# Patient Record
Sex: FEMALE | Race: WHITE | NOT HISPANIC OR LATINO | Employment: OTHER | URBAN - METROPOLITAN AREA
[De-identification: names, ages, dates, MRNs, and addresses within clinical notes are randomized per-mention and may not be internally consistent; named-entity substitution may affect disease eponyms.]

---

## 2017-10-03 ENCOUNTER — OFFICE VISIT (OUTPATIENT)
Dept: FAMILY MEDICINE CLINIC | Facility: CLINIC | Age: 37
End: 2017-10-03

## 2017-10-03 VITALS
DIASTOLIC BLOOD PRESSURE: 80 MMHG | RESPIRATION RATE: 16 BRPM | SYSTOLIC BLOOD PRESSURE: 120 MMHG | TEMPERATURE: 98.9 F | WEIGHT: 143 LBS | HEART RATE: 64 BPM | BODY MASS INDEX: 23.08 KG/M2

## 2017-10-03 DIAGNOSIS — Z20.2 POTENTIAL EXPOSURE TO STD: Primary | ICD-10-CM

## 2017-10-03 LAB
HCV AB SER DONR QL: NORMAL
HIV1+2 AB SER QL: NORMAL

## 2017-10-03 PROCEDURE — G0432 EIA HIV-1/HIV-2 SCREEN: HCPCS | Performed by: FAMILY MEDICINE

## 2017-10-03 PROCEDURE — 99213 OFFICE O/P EST LOW 20 MIN: CPT | Performed by: FAMILY MEDICINE

## 2017-10-03 PROCEDURE — 36415 COLL VENOUS BLD VENIPUNCTURE: CPT | Performed by: FAMILY MEDICINE

## 2017-10-03 PROCEDURE — 86592 SYPHILIS TEST NON-TREP QUAL: CPT | Performed by: FAMILY MEDICINE

## 2017-10-03 PROCEDURE — 86803 HEPATITIS C AB TEST: CPT | Performed by: FAMILY MEDICINE

## 2017-10-03 NOTE — PROGRESS NOTES
Subjective   Uriel Herman is a 37 y.o. female.     History of Present Illness   Found questionable information about dating sites on [employer] tablet and feels uneasy all of a sudden. To see a counselor later today. Has not been seeing other people. Had Pap Sept 7 with good report. 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  No active symptoms of infection.  The following portions of the patient's history were reviewed and updated as appropriate: allergies, current medications, past family history, past medical history, past social history, past surgical history and problem list.    Review of Systems   Constitutional: Negative.    Respiratory: Negative.    Cardiovascular: Negative.    Gastrointestinal: Negative.    Genitourinary: Negative for difficulty urinating, dysuria and vaginal discharge.   Allergic/Immunologic: Negative.        Objective   Physical Exam   Constitutional: She appears well-developed. No distress.   Neurological: She is alert.   Psychiatric: She has a normal mood and affect.   Vitals reviewed.      Assessment/Plan   Uriel was seen today for exposure to std.    Diagnoses and all orders for this visit:    Potential exposure to STD  -     Chlamydia trachomatis, Neisseria gonorrhoeae, PCR - Urine, Vagina  -     RPR  -     Hepatitis C Antibody  -     HIV-1/O/2 Ag/Ab w Reflex

## 2017-10-04 LAB — RPR SER QL: NORMAL

## 2019-05-03 ENCOUNTER — OFFICE VISIT (OUTPATIENT)
Dept: FAMILY MEDICINE CLINIC | Facility: CLINIC | Age: 39
End: 2019-05-03

## 2019-05-03 VITALS
BODY MASS INDEX: 23.95 KG/M2 | HEART RATE: 75 BPM | HEIGHT: 66 IN | TEMPERATURE: 97.6 F | SYSTOLIC BLOOD PRESSURE: 106 MMHG | RESPIRATION RATE: 16 BRPM | DIASTOLIC BLOOD PRESSURE: 64 MMHG | OXYGEN SATURATION: 98 % | WEIGHT: 149 LBS

## 2019-05-03 DIAGNOSIS — R59.0 ENLARGED LYMPH NODE IN NECK: ICD-10-CM

## 2019-05-03 DIAGNOSIS — R42 INTERMITTENT VERTIGO: ICD-10-CM

## 2019-05-03 DIAGNOSIS — N64.4 BREAST PAIN, RIGHT: ICD-10-CM

## 2019-05-03 DIAGNOSIS — H65.03 BILATERAL ACUTE SEROUS OTITIS MEDIA, RECURRENCE NOT SPECIFIED: ICD-10-CM

## 2019-05-03 DIAGNOSIS — Z00.00 ANNUAL PHYSICAL EXAM: Primary | ICD-10-CM

## 2019-05-03 LAB
ALBUMIN SERPL-MCNC: 4.3 G/DL (ref 3.5–5.2)
ALBUMIN/GLOB SERPL: 1.7 G/DL
ALP SERPL-CCNC: 69 U/L (ref 39–117)
ALT SERPL W P-5'-P-CCNC: 19 U/L (ref 1–33)
ANION GAP SERPL CALCULATED.3IONS-SCNC: 12.4 MMOL/L
AST SERPL-CCNC: 19 U/L (ref 1–32)
BASOPHILS # BLD AUTO: 0.04 10*3/MM3 (ref 0–0.2)
BASOPHILS NFR BLD AUTO: 0.6 % (ref 0–1.5)
BILIRUB BLD-MCNC: NEGATIVE MG/DL
BILIRUB SERPL-MCNC: 0.3 MG/DL (ref 0.2–1.2)
BUN BLD-MCNC: 12 MG/DL (ref 6–20)
BUN/CREAT SERPL: 15.6 (ref 7–25)
CALCIUM SPEC-SCNC: 8.9 MG/DL (ref 8.6–10.5)
CHLORIDE SERPL-SCNC: 102 MMOL/L (ref 98–107)
CHOLEST SERPL-MCNC: 176 MG/DL (ref 0–200)
CLARITY, POC: CLEAR
CO2 SERPL-SCNC: 23.6 MMOL/L (ref 22–29)
COLOR UR: YELLOW
CREAT BLD-MCNC: 0.77 MG/DL (ref 0.57–1)
DEPRECATED RDW RBC AUTO: 43.2 FL (ref 37–54)
EOSINOPHIL # BLD AUTO: 0.04 10*3/MM3 (ref 0–0.4)
EOSINOPHIL NFR BLD AUTO: 0.6 % (ref 0.3–6.2)
ERYTHROCYTE [DISTWIDTH] IN BLOOD BY AUTOMATED COUNT: 11.9 % (ref 12.3–15.4)
EXPIRATION DATE: ABNORMAL
GFR SERPL CREATININE-BSD FRML MDRD: 83 ML/MIN/1.73
GLOBULIN UR ELPH-MCNC: 2.6 GM/DL
GLUCOSE BLD-MCNC: 81 MG/DL (ref 65–99)
GLUCOSE UR STRIP-MCNC: NEGATIVE MG/DL
HCT VFR BLD AUTO: 44.4 % (ref 34–46.6)
HDLC SERPL-MCNC: 43 MG/DL (ref 40–60)
HGB BLD-MCNC: 14.1 G/DL (ref 12–15.9)
IMM GRANULOCYTES # BLD AUTO: 0.02 10*3/MM3 (ref 0–0.05)
IMM GRANULOCYTES NFR BLD AUTO: 0.3 % (ref 0–0.5)
KETONES UR QL: ABNORMAL
LDLC SERPL CALC-MCNC: 119 MG/DL (ref 0–100)
LDLC/HDLC SERPL: 2.76 {RATIO}
LEUKOCYTE EST, POC: NEGATIVE
LYMPHOCYTES # BLD AUTO: 1.74 10*3/MM3 (ref 0.7–3.1)
LYMPHOCYTES NFR BLD AUTO: 24.4 % (ref 19.6–45.3)
Lab: ABNORMAL
MCH RBC QN AUTO: 31.5 PG (ref 26.6–33)
MCHC RBC AUTO-ENTMCNC: 31.8 G/DL (ref 31.5–35.7)
MCV RBC AUTO: 99.1 FL (ref 79–97)
MONOCYTES # BLD AUTO: 0.34 10*3/MM3 (ref 0.1–0.9)
MONOCYTES NFR BLD AUTO: 4.8 % (ref 5–12)
NEUTROPHILS # BLD AUTO: 4.94 10*3/MM3 (ref 1.7–7)
NEUTROPHILS NFR BLD AUTO: 69.3 % (ref 42.7–76)
NITRITE UR-MCNC: NEGATIVE MG/ML
NRBC BLD AUTO-RTO: 0 /100 WBC (ref 0–0.2)
PH UR: 5.5 [PH] (ref 5–8)
PLATELET # BLD AUTO: 224 10*3/MM3 (ref 140–450)
PMV BLD AUTO: 11.1 FL (ref 6–12)
POTASSIUM BLD-SCNC: 4 MMOL/L (ref 3.5–5.2)
PROT SERPL-MCNC: 6.9 G/DL (ref 6–8.5)
PROT UR STRIP-MCNC: NEGATIVE MG/DL
RBC # BLD AUTO: 4.48 10*6/MM3 (ref 3.77–5.28)
RBC # UR STRIP: NEGATIVE /UL
SODIUM BLD-SCNC: 138 MMOL/L (ref 136–145)
SP GR UR: 1.01 (ref 1–1.03)
TRIGL SERPL-MCNC: 71 MG/DL (ref 0–150)
TSH SERPL DL<=0.05 MIU/L-ACNC: 1.63 MIU/ML (ref 0.27–4.2)
UROBILINOGEN UR QL: NORMAL
VLDLC SERPL-MCNC: 14.2 MG/DL (ref 5–40)
WBC NRBC COR # BLD: 7.12 10*3/MM3 (ref 3.4–10.8)

## 2019-05-03 PROCEDURE — 84443 ASSAY THYROID STIM HORMONE: CPT | Performed by: NURSE PRACTITIONER

## 2019-05-03 PROCEDURE — 81003 URINALYSIS AUTO W/O SCOPE: CPT | Performed by: NURSE PRACTITIONER

## 2019-05-03 PROCEDURE — 85025 COMPLETE CBC W/AUTO DIFF WBC: CPT | Performed by: NURSE PRACTITIONER

## 2019-05-03 PROCEDURE — 36415 COLL VENOUS BLD VENIPUNCTURE: CPT | Performed by: NURSE PRACTITIONER

## 2019-05-03 PROCEDURE — 80053 COMPREHEN METABOLIC PANEL: CPT | Performed by: NURSE PRACTITIONER

## 2019-05-03 PROCEDURE — 99395 PREV VISIT EST AGE 18-39: CPT | Performed by: NURSE PRACTITIONER

## 2019-05-03 PROCEDURE — 80061 LIPID PANEL: CPT | Performed by: NURSE PRACTITIONER

## 2019-05-03 RX ORDER — MECLIZINE HYDROCHLORIDE 25 MG/1
25 TABLET ORAL 2 TIMES DAILY PRN
Qty: 30 TABLET | Refills: 0 | Status: SHIPPED | OUTPATIENT
Start: 2019-05-03 | End: 2023-02-07

## 2019-05-03 NOTE — PATIENT INSTRUCTIONS
Health Maintenance, Female  Adopting a healthy lifestyle and getting preventive care can go a long way to promote health and wellness. Talk with your health care provider about what schedule of regular examinations is right for you. This is a good chance for you to check in with your provider about disease prevention and staying healthy.  In between checkups, there are plenty of things you can do on your own. Experts have done a lot of research about which lifestyle changes and preventive measures are most likely to keep you healthy. Ask your health care provider for more information.  Weight and diet  Eat a healthy diet  · Be sure to include plenty of vegetables, fruits, low-fat dairy products, and lean protein.  · Do not eat a lot of foods high in solid fats, added sugars, or salt.  · Get regular exercise. This is one of the most important things you can do for your health.  ? Most adults should exercise for at least 150 minutes each week. The exercise should increase your heart rate and make you sweat (moderate-intensity exercise).  ? Most adults should also do strengthening exercises at least twice a week. This is in addition to the moderate-intensity exercise.    Maintain a healthy weight  · Body mass index (BMI) is a measurement that can be used to identify possible weight problems. It estimates body fat based on height and weight. Your health care provider can help determine your BMI and help you achieve or maintain a healthy weight.  · For females 20 years of age and older:  ? A BMI below 18.5 is considered underweight.  ? A BMI of 18.5 to 24.9 is normal.  ? A BMI of 25 to 29.9 is considered overweight.  ? A BMI of 30 and above is considered obese.    Watch levels of cholesterol and blood lipids  · You should start having your blood tested for lipids and cholesterol at 20 years of age, then have this test every 5 years.  · You may need to have your cholesterol levels checked more often if:  ? Your lipid or  cholesterol levels are high.  ? You are older than 50 years of age.  ? You are at high risk for heart disease.    Cancer screening  Lung Cancer  · Lung cancer screening is recommended for adults 55-80 years old who are at high risk for lung cancer because of a history of smoking.  · A yearly low-dose CT scan of the lungs is recommended for people who:  ? Currently smoke.  ? Have quit within the past 15 years.  ? Have at least a 30-pack-year history of smoking. A pack year is smoking an average of one pack of cigarettes a day for 1 year.  · Yearly screening should continue until it has been 15 years since you quit.  · Yearly screening should stop if you develop a health problem that would prevent you from having lung cancer treatment.    Breast Cancer  · Practice breast self-awareness. This means understanding how your breasts normally appear and feel.  · It also means doing regular breast self-exams. Let your health care provider know about any changes, no matter how small.  · If you are in your 20s or 30s, you should have a clinical breast exam (CBE) by a health care provider every 1-3 years as part of a regular health exam.  · If you are 40 or older, have a CBE every year. Also consider having a breast X-ray (mammogram) every year.  · If you have a family history of breast cancer, talk to your health care provider about genetic screening.  · If you are at high risk for breast cancer, talk to your health care provider about having an MRI and a mammogram every year.  · Breast cancer gene (BRCA) assessment is recommended for women who have family members with BRCA-related cancers. BRCA-related cancers include:  ? Breast.  ? Ovarian.  ? Tubal.  ? Peritoneal cancers.  · Results of the assessment will determine the need for genetic counseling and BRCA1 and BRCA2 testing.    Cervical Cancer  Your health care provider may recommend that you be screened regularly for cancer of the pelvic organs (ovaries, uterus, and  vagina). This screening involves a pelvic examination, including checking for microscopic changes to the surface of your cervix (Pap test). You may be encouraged to have this screening done every 3 years, beginning at age 21.  · For women ages 30-65, health care providers may recommend pelvic exams and Pap testing every 3 years, or they may recommend the Pap and pelvic exam, combined with testing for human papilloma virus (HPV), every 5 years. Some types of HPV increase your risk of cervical cancer. Testing for HPV may also be done on women of any age with unclear Pap test results.  · Other health care providers may not recommend any screening for nonpregnant women who are considered low risk for pelvic cancer and who do not have symptoms. Ask your health care provider if a screening pelvic exam is right for you.  · If you have had past treatment for cervical cancer or a condition that could lead to cancer, you need Pap tests and screening for cancer for at least 20 years after your treatment. If Pap tests have been discontinued, your risk factors (such as having a new sexual partner) need to be reassessed to determine if screening should resume. Some women have medical problems that increase the chance of getting cervical cancer. In these cases, your health care provider may recommend more frequent screening and Pap tests.    Colorectal Cancer  · This type of cancer can be detected and often prevented.  · Routine colorectal cancer screening usually begins at 50 years of age and continues through 75 years of age.  · Your health care provider may recommend screening at an earlier age if you have risk factors for colon cancer.  · Your health care provider may also recommend using home test kits to check for hidden blood in the stool.  · A small camera at the end of a tube can be used to examine your colon directly (sigmoidoscopy or colonoscopy). This is done to check for the earliest forms of colorectal  cancer.  · Routine screening usually begins at age 50.  · Direct examination of the colon should be repeated every 5-10 years through 75 years of age. However, you may need to be screened more often if early forms of precancerous polyps or small growths are found.    Skin Cancer  · Check your skin from head to toe regularly.  · Tell your health care provider about any new moles or changes in moles, especially if there is a change in a mole's shape or color.  · Also tell your health care provider if you have a mole that is larger than the size of a pencil eraser.  · Always use sunscreen. Apply sunscreen liberally and repeatedly throughout the day.  · Protect yourself by wearing long sleeves, pants, a wide-brimmed hat, and sunglasses whenever you are outside.    Heart disease, diabetes, and high blood pressure  · High blood pressure causes heart disease and increases the risk of stroke. High blood pressure is more likely to develop in:  ? People who have blood pressure in the high end of the normal range (130-139/85-89 mm Hg).  ? People who are overweight or obese.  ? People who are .  · If you are 18-39 years of age, have your blood pressure checked every 3-5 years. If you are 40 years of age or older, have your blood pressure checked every year. You should have your blood pressure measured twice--once when you are at a hospital or clinic, and once when you are not at a hospital or clinic. Record the average of the two measurements. To check your blood pressure when you are not at a hospital or clinic, you can use:  ? An automated blood pressure machine at a pharmacy.  ? A home blood pressure monitor.  · If you are between 55 years and 79 years old, ask your health care provider if you should take aspirin to prevent strokes.  · Have regular diabetes screenings. This involves taking a blood sample to check your fasting blood sugar level.  ? If you are at a normal weight and have a low risk for  diabetes, have this test once every three years after 45 years of age.  ? If you are overweight and have a high risk for diabetes, consider being tested at a younger age or more often.  Preventing infection  Hepatitis B  · If you have a higher risk for hepatitis B, you should be screened for this virus. You are considered at high risk for hepatitis B if:  ? You were born in a country where hepatitis B is common. Ask your health care provider which countries are considered high risk.  ? Your parents were born in a high-risk country, and you have not been immunized against hepatitis B (hepatitis B vaccine).  ? You have HIV or AIDS.  ? You use needles to inject street drugs.  ? You live with someone who has hepatitis B.  ? You have had sex with someone who has hepatitis B.  ? You get hemodialysis treatment.  ? You take certain medicines for conditions, including cancer, organ transplantation, and autoimmune conditions.    Hepatitis C  · Blood testing is recommended for:  ? Everyone born from 1945 through 1965.  ? Anyone with known risk factors for hepatitis C.    Sexually transmitted infections (STIs)  · You should be screened for sexually transmitted infections (STIs) including gonorrhea and chlamydia if:  ? You are sexually active and are younger than 24 years of age.  ? You are older than 24 years of age and your health care provider tells you that you are at risk for this type of infection.  ? Your sexual activity has changed since you were last screened and you are at an increased risk for chlamydia or gonorrhea. Ask your health care provider if you are at risk.  · If you do not have HIV, but are at risk, it may be recommended that you take a prescription medicine daily to prevent HIV infection. This is called pre-exposure prophylaxis (PrEP). You are considered at risk if:  ? You are sexually active and do not regularly use condoms or know the HIV status of your partner(s).  ? You take drugs by injection.  ? You  are sexually active with a partner who has HIV.    Talk with your health care provider about whether you are at high risk of being infected with HIV. If you choose to begin PrEP, you should first be tested for HIV. You should then be tested every 3 months for as long as you are taking PrEP.  Pregnancy  · If you are premenopausal and you may become pregnant, ask your health care provider about preconception counseling.  · If you may become pregnant, take 400 to 800 micrograms (mcg) of folic acid every day.  · If you want to prevent pregnancy, talk to your health care provider about birth control (contraception).  Osteoporosis and menopause  · Osteoporosis is a disease in which the bones lose minerals and strength with aging. This can result in serious bone fractures. Your risk for osteoporosis can be identified using a bone density scan.  · If you are 65 years of age or older, or if you are at risk for osteoporosis and fractures, ask your health care provider if you should be screened.  · Ask your health care provider whether you should take a calcium or vitamin D supplement to lower your risk for osteoporosis.  · Menopause may have certain physical symptoms and risks.  · Hormone replacement therapy may reduce some of these symptoms and risks.  Talk to your health care provider about whether hormone replacement therapy is right for you.  Follow these instructions at home:  · Schedule regular health, dental, and eye exams.  · Stay current with your immunizations.  · Do not use any tobacco products including cigarettes, chewing tobacco, or electronic cigarettes.  · If you are pregnant, do not drink alcohol.  · If you are breastfeeding, limit how much and how often you drink alcohol.  · Limit alcohol intake to no more than 1 drink per day for nonpregnant women. One drink equals 12 ounces of beer, 5 ounces of wine, or 1½ ounces of hard liquor.  · Do not use street drugs.  · Do not share needles.  · Ask your health care  provider for help if you need support or information about quitting drugs.  · Tell your health care provider if you often feel depressed.  · Tell your health care provider if you have ever been abused or do not feel safe at home.  This information is not intended to replace advice given to you by your health care provider. Make sure you discuss any questions you have with your health care provider.  Document Released: 07/02/2012 Document Revised: 05/25/2017 Document Reviewed: 09/20/2016  [employer] Interactive Patient Education © 2019 [employer]    Preventive Care 18-39 Years, Female  Preventive care refers to lifestyle choices and visits with your health care provider that can promote health and wellness.  What does preventive care include?  · A yearly physical exam. This is also called an annual well check.  · Dental exams once or twice a year.  · Routine eye exams. Ask your health care provider how often you should have your eyes checked.  · Personal lifestyle choices, including:  ? Daily care of your teeth and gums.  ? Regular physical activity.  ? Eating a healthy diet.  ? Avoiding tobacco and drug use.  ? Limiting alcohol use.  ? Practicing safe sex.  ? Taking vitamin and mineral supplements as recommended by your health care provider.  What happens during an annual well check?  The services and screenings done by your health care provider during your annual well check will depend on your age, overall health, lifestyle risk factors, and family history of disease.  Counseling  Your health care provider may ask you questions about your:  · Alcohol use.  · Tobacco use.  · Drug use.  · Emotional well-being.  · Home and relationship well-being.  · Sexual activity.  · Eating habits.  · Work and work environment.  · Method of birth control.  · Menstrual cycle.  · Pregnancy history.    Screening  You may have the following tests or measurements:  · Height, weight, and BMI.  · Diabetes screening. This is done by  checking your blood sugar (glucose) after you have not eaten for a while (fasting).  · Blood pressure.  · Lipid and cholesterol levels. These may be checked every 5 years starting at age 20.  · Skin check.  · Hepatitis C blood test.  · Hepatitis B blood test.  · Sexually transmitted disease (STD) testing.  · BRCA-related cancer screening. This may be done if you have a family history of breast, ovarian, tubal, or peritoneal cancers.  · Pelvic exam and Pap test. This may be done every 3 years starting at age 21. Starting at age 30, this may be done every 5 years if you have a Pap test in combination with an HPV test.    Discuss your test results, treatment options, and if necessary, the need for more tests with your health care provider.  Vaccines  Your health care provider may recommend certain vaccines, such as:  · Influenza vaccine. This is recommended every year.  · Tetanus, diphtheria, and acellular pertussis (Tdap, Td) vaccine. You may need a Td booster every 10 years.  · Varicella vaccine. You may need this if you have not been vaccinated.  · HPV vaccine. If you are 26 or younger, you may need three doses over 6 months.  · Measles, mumps, and rubella (MMR) vaccine. You may need at least one dose of MMR. You may also need a second dose.  · Pneumococcal 13-valent conjugate (PCV13) vaccine. You may need this if you have certain conditions and were not previously vaccinated.  · Pneumococcal polysaccharide (PPSV23) vaccine. You may need one or two doses if you smoke cigarettes or if you have certain conditions.  · Meningococcal vaccine. One dose is recommended if you are age 19-21 years and a first-year college student living in a residence [place], or if you have one of several medical conditions. You may also need additional booster doses.  · Hepatitis A vaccine. You may need this if you have certain conditions or if you travel or work in places where you may be exposed to hepatitis A.  · Hepatitis B vaccine. You  may need this if you have certain conditions or if you travel or work in places where you may be exposed to hepatitis B.  · Haemophilus influenzae type b (Hib) vaccine. You may need this if you have certain risk factors.    Talk to your health care provider about which screenings and vaccines you need and how often you need them.  This information is not intended to replace advice given to you by your health care provider. Make sure you discuss any questions you have with your health care provider.  Document Released: 02/13/2003 Document Revised: 07/31/2018 Document Reviewed: 10/18/2016  [employer] Interactive Patient Education © 2019 [employer]  Vertigo  Vertigo is the feeling that you or your surroundings are moving when they are not. Vertigo can be dangerous if it occurs while you are doing something that could endanger you or others, such as driving.  What are the causes?  This condition is caused by a disturbance in the signals that are sent by your body’s sensory systems to your brain. Different causes of a disturbance can lead to vertigo, including:  · Infections, especially in the inner ear.  · A bad reaction to a drug, or misuse of alcohol and medicines.  · Withdrawal from drugs or alcohol.  · Quickly changing positions, as when lying down or rolling over in bed.  · Migraine headaches.  · Decreased blood flow to the brain.  · Decreased blood pressure.  · Increased pressure in the brain from a head or neck injury, stroke, infection, tumor, or bleeding.  · Central nervous system disorders.    What are the signs or symptoms?  Symptoms of this condition usually occur when you move your head or your eyes in different directions. Symptoms may start suddenly, and they usually last for less than a minute. Symptoms may include:  · Loss of balance and falling.  · Feeling like you are spinning or moving.  · Feeling like your surroundings are spinning or moving.  · Nausea and vomiting.  · Blurred vision or double  vision.  · Difficulty hearing.  · Slurred speech.  · Dizziness.  · Involuntary eye movement (nystagmus).    Symptoms can be mild and cause only slight annoyance, or they can be severe and interfere with daily life. Episodes of vertigo may return (recur) over time, and they are often triggered by certain movements. Symptoms may improve over time.  How is this diagnosed?  This condition may be diagnosed based on medical history and the quality of your nystagmus. Your health care provider may test your eye movements by asking you to quickly change positions to trigger the nystagmus. This may be called the Lorraine-Hallpike test, head thrust test, or roll test. You may be referred to a health care provider who specializes in ear, nose, and throat (ENT) problems (otolaryngologist) or a provider who specializes in disorders of the central nervous system (neurologist).  You may have additional testing, including:  · A physical exam.  · Blood tests.  · MRI.  · A CT scan.  · An electrocardiogram (ECG). This records electrical activity in your heart.  · An electroencephalogram (EEG). This records electrical activity in your brain.  · Hearing tests.    How is this treated?  Treatment for this condition depends on the cause and the severity of the symptoms. Treatment options include:  · Medicines to treat nausea or vertigo. These are usually used for severe cases. Some medicines that are used to treat other conditions may also reduce or eliminate vertigo symptoms. These include:  ? Medicines that control allergies (antihistamines).  ? Medicines that control seizures (anticonvulsants).  ? Medicines that relieve depression (antidepressants).  ? Medicines that relieve anxiety (sedatives).  · Head movements to adjust your inner ear back to normal. If your vertigo is caused by an ear problem, your health care provider may recommend certain movements to correct the problem.  · Surgery. This is rare.    Follow these instructions at  home:  Safety  · Move slowly.Avoid sudden body or head movements.  · Avoid driving.  · Avoid operating heavy machinery.  · Avoid doing any tasks that would cause danger to you or others if you would have a vertigo episode during the task.  · If you have trouble walking or keeping your balance, try using a cane for stability. If you feel dizzy or unstable, sit down right away.  · Return to your normal activities as told by your health care provider. Ask your health care provider what activities are safe for you.  General instructions  · Take over-the-counter and prescription medicines only as told by your health care provider.  · Avoid certain positions or movements as told by your health care provider.  · Drink enough fluid to keep your urine clear or pale yellow.  · Keep all follow-up visits as told by your health care provider. This is important.  Contact a health care provider if:  · Your medicines do not relieve your vertigo or they make it worse.  · You have a fever.  · Your condition gets worse or you develop new symptoms.  · Your family or friends notice any behavioral changes.  · Your nausea or vomiting gets worse.  · You have numbness or a “pins and needles” sensation in part of your body.  Get help right away if:  · You have difficulty moving or speaking.  · You are always dizzy.  · You faint.  · You develop severe headaches.  · You have weakness in your hands, arms, or legs.  · You have changes in your hearing or vision.  · You develop a stiff neck.  · You develop sensitivity to light.  This information is not intended to replace advice given to you by your health care provider. Make sure you discuss any questions you have with your health care provider.  Document Released: 09/27/2006 Document Revised: 05/31/2017 Document Reviewed: 04/11/2016  [employer] Interactive Patient Education © 2019 [employer]  Meclizine tablets or capsules  What is this medicine?  MECLIZINE (NATY cleary) is an antihistamine. It  is used to prevent nausea, vomiting, or dizziness caused by motion sickness. It is also used to prevent and treat vertigo (extreme dizziness or a feeling that you or your surroundings are tilting or spinning around).  This medicine may be used for other purposes; ask your health care provider or pharmacist if you have questions.  COMMON BRAND NAME(S): Antivert, Dramamine Less Drowsy, Dramamine-N, Medivert, Meni-D  What should I tell my health care provider before I take this medicine?  They need to know if you have any of these conditions:  -glaucoma  -lung or breathing disease, like asthma  -problems urinating  -prostate disease  -stomach or intestine problems  -an unusual or allergic reaction to meclizine, other medicines, foods, dyes, or preservatives  -pregnant or trying to get pregnant  -breast-feeding  How should I use this medicine?  Take this medicine by mouth with a glass of water. Follow the directions on the prescription label. If you are using this medicine to prevent motion sickness, take the dose at least 1 hour before travel. If it upsets your stomach, take it with food or milk. Take your doses at regular intervals. Do not take your medicine more often than directed.  Talk to your pediatrician regarding the use of this medicine in children. Special care may be needed.  Overdosage: If you think you have taken too much of this medicine contact a poison control center or emergency room at once.  NOTE: This medicine is only for you. Do not share this medicine with others.  What if I miss a dose?  If you miss a dose, take it as soon as you can. If it is almost time for your next dose, take only that dose. Do not take double or extra doses.  What may interact with this medicine?  Do not take this medicine with any of the following medications:  -MAOIs like Carbex, Eldepryl, Marplan, Nardil, and Parnate  This medicine may also interact with the following medications:  -alcohol  -antihistamines for allergy,  cough and cold  -certain medicines for anxiety or sleep  -certain medicines for depression, like amitriptyline, fluoxetine, sertraline  -certain medicines for seizures like phenobarbital, primidone  -general anesthetics like halothane, isoflurane, methoxyflurane, propofol  -local anesthetics like lidocaine, pramoxine, tetracaine  -medicines that relax muscles for surgery  -narcotic medicines for pain  -phenothiazines like chlorpromazine, mesoridazine, prochlorperazine, thioridazine  This list may not describe all possible interactions. Give your health care provider a list of all the medicines, herbs, non-prescription drugs, or dietary supplements you use. Also tell them if you smoke, drink alcohol, or use illegal drugs. Some items may interact with your medicine.  What should I watch for while using this medicine?  Tell your doctor or healthcare professional if your symptoms do not start to get better or if they get worse.  You may get drowsy or dizzy. Do not drive, use machinery, or do anything that needs mental alertness until you know how this medicine affects you. Do not stand or sit up quickly, especially if you are an older patient. This reduces the risk of dizzy or fainting spells. Alcohol may interfere with the effect of this medicine. Avoid alcoholic drinks.  Your mouth may get dry. Chewing sugarless gum or sucking hard candy, and drinking plenty of water may help. Contact your doctor if the problem does not go away or is severe.  This medicine may cause dry eyes and blurred vision. If you wear contact lenses you may feel some discomfort. Lubricating drops may help. See your eye doctor if the problem does not go away or is severe.  What side effects may I notice from receiving this medicine?  Side effects that you should report to your doctor or health care professional as soon as possible:  -feeling faint or lightheaded, falls  -fast, irregular heartbeat  Side effects that usually do not require medical  attention (report to your doctor or health care professional if they continue or are bothersome):  -constipation  -headache  -trouble passing urine or change in the amount of urine  -trouble sleeping  -upset stomach  This list may not describe all possible side effects. Call your doctor for medical advice about side effects. You may report side effects to FDA at 2-713-QUH-3137.  Where should I keep my medicine?  Keep out of the reach of children.  Store at room temperature between 15 and 30 degrees C (59 and 86 degrees F). Keep container tightly closed. Throw away any unused medicine after the expiration date.  NOTE: This sheet is a summary. It may not cover all possible information. If you have questions about this medicine, talk to your doctor, pharmacist, or health care provider.  © 2019 [employer]/Gold Standard (2017-01-18 19:41:02)    Breast Tenderness  Breast tenderness is a common problem for women of all ages. Breast tenderness may cause mild discomfort to severe pain. The pain usually comes and goes in association with your menstrual cycle, but it can be constant. Breast tenderness has many possible causes, including hormone changes and some medicines. Your health care provider may order tests, such as a mammogram or an ultrasound, to check for any unusual findings. Having breast tenderness usually does not mean that you have breast cancer.  Follow these instructions at home:  Sometimes, reassurance that you do not have breast cancer is all that is needed. In general, follow these home care instructions:  Managing pain and discomfort  · If directed, apply ice to the area:  ? Put ice in a plastic bag.  ? Place a towel between your skin and the bag.  ? Leave the ice on for 20 minutes, 2-3 times a day.  · Make sure you are wearing a supportive bra, especially during exercise. You may also want to wear a supportive bra while sleeping if your breasts are very tender.  Medicines  · Take over-the-counter and  prescription medicines only as told by your health care provider. If the cause of your pain is infection, you may be prescribed an antibiotic medicine.  · If you were prescribed an antibiotic, take it as told by your health care provider. Do not stop taking the antibiotic even if you start to feel better.  General instructions  · Your health care provider may recommend that you reduce the amount of fat in your diet. You can do this by:  ? Limiting fried foods.  ? Cooking foods using methods, such as baking, boiling, grilling, and broiling.  · Decrease the amount of caffeine in your diet. You can do this by drinking more water and choosing caffeine-free options.  · Keep a log of the days and times when your breasts are most tender.  · Ask your health care provider how to do breast exams at home. This will help you notice if you have an unusual growth or lump.  Contact a health care provider if:  · Any part of your breast is hard, red, and hot to the touch. This may be a sign of infection.  · You are not breastfeeding and you have fluid, especially blood or pus, coming out of your nipples.  · You have a fever.  · You have a new or painful lump in your breast that remains after your menstrual period ends.  · Your pain does not improve or it gets worse.  · Your pain is interfering with your daily activities.  This information is not intended to replace advice given to you by your health care provider. Make sure you discuss any questions you have with your health care provider.  Document Released: 11/30/2009 Document Revised: 09/15/2017 Document Reviewed: 09/15/2017  [employer] Patient Education © 2019 [employer]

## 2019-05-03 NOTE — PROGRESS NOTES
Argentina iWlkinson is a 39 y.o. female.     The patient presents today for her annual physical exam and fasting labs. She is also c/o intermittent vertigo and right breast pain.       Dizziness   This is a recurrent problem. The current episode started in the past 7 days. The problem occurs intermittently. The problem has been waxing and waning. Associated symptoms include congestion, numbness and vertigo. Pertinent negatives include no abdominal pain, change in bowel habit, chest pain, chills, coughing, diaphoresis, fatigue, fever, myalgias, nausea, rash, sore throat, urinary symptoms, vomiting or weakness.   Breast Pain   This is a new problem. The current episode started more than 1 month ago. The problem occurs intermittently. The problem has been waxing and waning. Associated symptoms include congestion, numbness and vertigo. Pertinent negatives include no abdominal pain, change in bowel habit, chest pain, chills, coughing, diaphoresis, fatigue, fever, myalgias, nausea, rash, sore throat, urinary symptoms, vomiting or weakness. Nothing aggravates the symptoms. She has tried nothing for the symptoms.       The following portions of the patient's history were reviewed and updated as appropriate: allergies, current medications, past family history, past medical history, past social history, past surgical history and problem list.    Allergies as of 05/03/2019 - Reviewed 05/03/2019   Allergen Reaction Noted   • Azithromycin  06/08/2016   • Cefdinir Other (See Comments)    • Cefuroxime  06/08/2016   • Ciprofloxacin Other (See Comments)    • Erythromycin  06/08/2016   • Fish-derived products Nausea And Vomiting    • Sulfa antibiotics Other (See Comments)    • Tetracycline  06/08/2016   • Latex, natural rubber Rash        Current Outpatient Medications on File Prior to Visit   Medication Sig   • Norethindrone-Eth Estradiol (PIRMELLA 1/35 PO) Take  by mouth.     No current facility-administered  medications on file prior to visit.        Review of Systems   Constitutional: Negative for activity change, appetite change, chills, diaphoresis, fatigue, fever and unexpected weight change.   HENT: Positive for congestion and sinus pressure. Negative for ear discharge, facial swelling, postnasal drip, sore throat, trouble swallowing and voice change. Ear pain: ear pressure.         Patient states that she has recently seen an ENT regarding her vertigo but is still having problems.   Eyes: Negative.    Respiratory: Negative.  Negative for cough.    Cardiovascular: Negative.  Negative for chest pain.   Gastrointestinal: Negative for abdominal pain, change in bowel habit, constipation, diarrhea, nausea and vomiting.   Genitourinary: Negative.    Musculoskeletal: Negative for myalgias.        Pain in right breast, swelling right underarm area, swelling lymph node right side of neck   Skin: Negative for rash.   Allergic/Immunologic: Positive for environmental allergies and food allergies.   Neurological: Positive for dizziness, vertigo and numbness. Negative for weakness.       Objective   Physical Exam   Constitutional: She is oriented to person, place, and time. Vital signs are normal. She appears well-developed and well-nourished. She is cooperative. She does not appear ill. No distress.   HENT:   Head: Normocephalic and atraumatic.   Right Ear: External ear normal. Tympanic membrane is bulging. Tympanic membrane is not erythematous. A middle ear effusion (moderate) is present.   Left Ear: External ear normal. Tympanic membrane is bulging. Tympanic membrane is not erythematous. A middle ear effusion (moderate) is present.   Nose: Mucosal edema present.   Mouth/Throat: Uvula is midline, oropharynx is clear and moist and mucous membranes are normal.   Eyes: Pupils are equal, round, and reactive to light. No scleral icterus.   Neck: Normal range of motion. Neck supple. No thyromegaly present.   Cardiovascular: Normal  rate, regular rhythm and normal heart sounds.   Pulmonary/Chest: Effort normal and breath sounds normal. She exhibits no mass. Right breast exhibits tenderness. Right breast exhibits no inverted nipple and no nipple discharge. Left breast exhibits no inverted nipple, no nipple discharge and no tenderness. Breasts are symmetrical. There is no breast swelling.       Abdominal: Soft. She exhibits no distension. There is no tenderness.   Genitourinary: No breast discharge or bleeding.   Lymphadenopathy:     She has no cervical adenopathy.   Neurological: She is alert and oriented to person, place, and time.   Skin: Skin is warm, dry and intact. No rash noted. She is not diaphoretic.   Psychiatric: She has a normal mood and affect. Her behavior is normal. Judgment and thought content normal.   Vitals reviewed.    Vitals:    05/03/19 0948   BP: 106/64   Pulse: 75   Resp: 16   Temp: 97.6 °F (36.4 °C)   SpO2: 98%     Body mass index is 24.05 kg/m².    Assessment/Plan   Uriel was seen today for annual exam.    Diagnoses and all orders for this visit:    Annual physical exam  -     Comprehensive Metabolic Panel  -     TSH  -     CBC Auto Differential  -     POC Urinalysis Dipstick, Automated  -     Lipid Panel    Brief Urine Lab Results  (Last result in the past 365 days)      Color   Clarity   Blood   Leuk Est   Nitrite   Protein   CREAT   Urine HCG        05/03/19 1733 Yellow Clear Negative Negative Negative Negative             Bilateral acute serous otitis media, recurrence not specified         -     Advised patient to begin daily nasal steroid spray such as OTC Nasacort         -     Recommend daily antihistamine such as Claritin or Zyrtec OTC       Intermittent vertigo  -     meclizine (ANTIVERT) 25 MG tablet; Take 1 tablet by mouth 2 (Two) Times a Day As Needed for dizziness or Nausea.        -     Provided patient with information regarding vestibular rehabilitation    Breast pain, right  -     Mammo diagnostic  digital tomosynthesis bilateral w CAD; Future    Enlarged lymph node in neck  -     Mammo diagnostic digital tomosynthesis bilateral w CAD; Future     Discussed the nature of the medical condition(s) risks, complications, implications, management, safe and proper use of medications. Encouraged medication compliance, and keeping scheduled follow up appointments with me and any other providers.     The patient is here for a health maintenance visit.  Currently, the patient consumes a healthy diet and has an adequate exercise regimen. Screening lab work is ordered.  Immunizations are declined today and vaccine education is provided.  Advice and education is given regarding nutrition, aerobic exercise, routine dental evaluations, routine eye exams, reproductive health, cardiovascular risk reduction, sunscreen use, self skin examination (annual dermatology evaluations) and seat belt use (general overall safety).  Further recommendations after lab evaluation.  Annual wellness evaluations recommended.

## 2019-05-22 ENCOUNTER — HOSPITAL ENCOUNTER (OUTPATIENT)
Dept: MAMMOGRAPHY | Facility: HOSPITAL | Age: 39
Discharge: HOME | End: 2019-05-22
Admitting: NURSE PRACTITIONER

## 2019-05-22 DIAGNOSIS — N64.4 BREAST PAIN, RIGHT: ICD-10-CM

## 2019-05-22 DIAGNOSIS — R59.0 ENLARGED LYMPH NODE IN NECK: ICD-10-CM

## 2019-05-22 PROCEDURE — 77066 DX MAMMO INCL CAD BI: CPT | Performed by: RADIOLOGY

## 2019-05-22 PROCEDURE — 77062 BREAST TOMOSYNTHESIS BI: CPT | Performed by: RADIOLOGY

## 2019-05-22 PROCEDURE — 77066 DX MAMMO INCL CAD BI: CPT

## 2019-05-22 PROCEDURE — G0279 TOMOSYNTHESIS, MAMMO: HCPCS

## 2020-02-20 ENCOUNTER — TRANSCRIBE ORDERS (OUTPATIENT)
Dept: ADMINISTRATIVE | Facility: HOSPITAL | Age: 40
End: 2020-02-20

## 2020-02-20 DIAGNOSIS — Z12.31 VISIT FOR SCREENING MAMMOGRAM: Primary | ICD-10-CM

## 2020-04-13 ENCOUNTER — TELEPHONE (OUTPATIENT)
Dept: FAMILY MEDICINE CLINIC | Facility: CLINIC | Age: 40
End: 2020-04-13

## 2020-04-13 NOTE — TELEPHONE ENCOUNTER
Pt called to request Xanax    Pt stated that she is having some anxiety. She stated that she is still working and being around ppl is making her live in fear       Pt is requesting something to calm her nerves.    Jj Colmenares  Ph: [phone]  Fax: [phone]          Please call and adv     [phone]

## 2020-04-14 ENCOUNTER — TELEMEDICINE (OUTPATIENT)
Dept: FAMILY MEDICINE CLINIC | Facility: CLINIC | Age: 40
End: 2020-04-14

## 2020-04-14 DIAGNOSIS — F41.9 ANXIETY: Primary | ICD-10-CM

## 2020-04-14 DIAGNOSIS — M62.838 CERVICAL PARASPINAL MUSCLE SPASM: ICD-10-CM

## 2020-04-14 PROCEDURE — 99213 OFFICE O/P EST LOW 20 MIN: CPT | Performed by: FAMILY MEDICINE

## 2020-04-14 RX ORDER — HYDROXYZINE HYDROCHLORIDE 25 MG/1
25 TABLET, FILM COATED ORAL EVERY 6 HOURS PRN
Qty: 100 TABLET | Refills: 3 | Status: SHIPPED | OUTPATIENT
Start: 2020-04-14 | End: 2022-05-05

## 2020-04-14 RX ORDER — CYCLOBENZAPRINE HCL 5 MG
5 TABLET ORAL 3 TIMES DAILY PRN
Qty: 30 TABLET | Refills: 0 | Status: SHIPPED | OUTPATIENT
Start: 2020-04-14

## 2020-04-14 NOTE — ASSESSMENT & PLAN NOTE
Patient has periodic cervical paraspinal muscle spasms.  She normally sees a chiropractor for adjustments but his office is not open at this time due to the coronavirus.  Patient would like refill of cyclobenzaprine to use as needed if she begins having muscle spasm.

## 2020-04-14 NOTE — ASSESSMENT & PLAN NOTE
Patient has had anxiety problems on and off for over life.  She states she has not taken medication for it in the past other than Xanax when she was in her 20s.  She has taken Celexa and Lexapro for approximately 1 month each for depression but she quit taking these due to side effect profiles.  She is not currently on any medications for anxiety or depression.  Discussed treatment options such as BuSpar and Effexor in combination with Vistaril as needed for panic attacks.  Patient declines these and insist on Xanax.  I told her that I not feel comfortable prescribing this medication without trying other controller medications first.  Patient wanted know who she could go see.  Prescribe Xanax.  I told her her best option for this would likely be a psychiatrist.  In the end she was agreeable to trying Vistaril. RTC precautions given.  She can follow-up with her PCP for further management of anxiety.

## 2020-04-14 NOTE — PROGRESS NOTES
Uriel Singleton is a 40 y.o. female who presents today for Anxiety      Anxiety   Presents for initial visit. Onset was 1 to 4 weeks ago (Has been present her whole life but worsened recently due to concerns over pandemic.). The problem has been waxing and waning. Symptoms include excessive worry, insomnia, irritability, nervous/anxious behavior and panic. Patient reports no chest pain, compulsions, confusion, decreased concentration, depressed mood, dizziness, dry mouth, feeling of choking, hyperventilation, impotence, malaise, muscle tension, nausea, obsessions, palpitations, restlessness, shortness of breath or suicidal ideas. Symptoms occur constantly. The symptoms are aggravated by family issues (COVID 19 pandemic.). The quality of sleep is fair.     Risk factors include a major life event. Her past medical history is significant for anxiety/panic attacks and depression. Past treatments include nothing.        SAMSON-7  Over the last two weeks, how often have you been bothered by the following problems?  Feeling nervous, anxious or on edge: 3  Not being able to stop or control worrying: 3  Worrying too much about different things: 3  Trouble Relaxing: 3  Being so restless that it is hard to sit still: 0  Becoming easily annoyed or irritable: 3  Feeling afraid as if something awful might happen: 3  SAMSON 7 Total Score: 18  If you checked any problems, how difficult have these problems made it for you to do your work, take care of things at home, or get along with other people: Very difficult      Review of Systems   Constitutional: Positive for irritability. Negative for fever and unexpected weight loss.   HENT: Negative for congestion, ear pain and sore throat.    Eyes: Negative for visual disturbance.   Respiratory: Negative for cough, shortness of breath and wheezing.    Cardiovascular: Negative for chest pain and palpitations.   Gastrointestinal: Negative for abdominal pain, blood in stool, constipation,  diarrhea, nausea, vomiting and GERD.   Endocrine: Negative for polydipsia and polyuria.   Genitourinary: Negative for difficulty urinating and impotence.   Musculoskeletal: Negative for joint swelling.   Skin: Negative for rash and skin lesions.   Allergic/Immunologic: Negative for environmental allergies.   Neurological: Negative for dizziness, seizures, syncope and confusion.   Hematological: Does not bruise/bleed easily.   Psychiatric/Behavioral: Negative for decreased concentration, suicidal ideas and depressed mood. The patient is nervous/anxious and has insomnia.         The following portions of the patient's history were reviewed and updated as appropriate: allergies, current medications, past family history, past medical history, past social history, past surgical history and problem list.    Current Outpatient Medications on File Prior to Visit   Medication Sig Dispense Refill   • meclizine (ANTIVERT) 25 MG tablet Take 1 tablet by mouth 2 (Two) Times a Day As Needed for dizziness or Nausea. 30 tablet 0   • Norethindrone-Eth Estradiol (PIRMELLA 1/35 PO) Take  by mouth.       No current facility-administered medications on file prior to visit.        Allergies   Allergen Reactions   • Azithromycin    • Cefdinir Other (See Comments)   • Cefuroxime    • Ciprofloxacin Other (See Comments)   • Erythromycin    • Fish-Derived Products Nausea And Vomiting   • Sulfa Antibiotics Other (See Comments)   • Tetracycline    • Latex, Natural Rubber Rash        There were no vitals taken for this visit.     Physical Exam   Constitutional: She is oriented to person, place, and time. She appears well-developed and well-nourished. No distress.   Pulmonary/Chest: Effort normal. No respiratory distress.   Neurological: She is alert and oriented to person, place, and time.   Psychiatric: She has a normal mood and affect. Her behavior is normal.             Problems Addressed this Visit        Musculoskeletal and Integument     Cervical paraspinal muscle spasm     Patient has periodic cervical paraspinal muscle spasms.  She normally sees a chiropractor for adjustments but his office is not open at this time due to the coronavirus.  Patient would like refill of cyclobenzaprine to use as needed if she begins having muscle spasm.         Relevant Medications    cyclobenzaprine (FLEXERIL) 5 MG tablet       Other    Anxiety - Primary     Patient has had anxiety problems on and off for over life.  She states she has not taken medication for it in the past other than Xanax when she was in her 20s.  She has taken Celexa and Lexapro for approximately 1 month each for depression but she quit taking these due to side effect profiles.  She is not currently on any medications for anxiety or depression.  Discussed treatment options such as BuSpar and Effexor in combination with Vistaril as needed for panic attacks.  Patient declines these and insist on Xanax.  I told her that I not feel comfortable prescribing this medication without trying other controller medications first.  Patient wanted know who she could go see.  Prescribe Xanax.  I told her her best option for this would likely be a psychiatrist.  In the end she was agreeable to trying Vistaril. RTC precautions given.  She can follow-up with her PCP for further management of anxiety.         Relevant Medications    hydrOXYzine (ATARAX) 25 MG tablet          Return if symptoms worsen or fail to improve.    Parts of this office note have been dictated by voice recognition software. Grammatical and/or spelling errors may be present.    Corey Simpson MD   4/14/2020

## 2020-06-01 ENCOUNTER — HOSPITAL ENCOUNTER (OUTPATIENT)
Dept: MAMMOGRAPHY | Facility: HOSPITAL | Age: 40
Discharge: HOME | End: 2020-06-01
Admitting: NURSE PRACTITIONER

## 2020-06-01 ENCOUNTER — APPOINTMENT (OUTPATIENT)
Dept: MAMMOGRAPHY | Facility: HOSPITAL | Age: 40
End: 2020-06-01

## 2020-06-01 DIAGNOSIS — Z12.31 VISIT FOR SCREENING MAMMOGRAM: ICD-10-CM

## 2020-06-01 PROCEDURE — 77067 SCR MAMMO BI INCL CAD: CPT

## 2020-06-01 PROCEDURE — 77063 BREAST TOMOSYNTHESIS BI: CPT

## 2020-06-01 PROCEDURE — 77067 SCR MAMMO BI INCL CAD: CPT | Performed by: RADIOLOGY

## 2020-06-01 PROCEDURE — 77063 BREAST TOMOSYNTHESIS BI: CPT | Performed by: RADIOLOGY

## 2021-07-13 ENCOUNTER — TRANSCRIBE ORDERS (OUTPATIENT)
Dept: ADMINISTRATIVE | Facility: HOSPITAL | Age: 41
End: 2021-07-13

## 2021-07-13 DIAGNOSIS — Z12.31 VISIT FOR SCREENING MAMMOGRAM: Primary | ICD-10-CM

## 2021-07-21 ENCOUNTER — HOSPITAL ENCOUNTER (OUTPATIENT)
Dept: MAMMOGRAPHY | Facility: HOSPITAL | Age: 41
Discharge: HOME | End: 2021-07-21
Admitting: NURSE PRACTITIONER

## 2021-07-21 DIAGNOSIS — Z12.31 VISIT FOR SCREENING MAMMOGRAM: ICD-10-CM

## 2021-07-21 PROCEDURE — 77063 BREAST TOMOSYNTHESIS BI: CPT | Performed by: RADIOLOGY

## 2021-07-21 PROCEDURE — 77067 SCR MAMMO BI INCL CAD: CPT | Performed by: RADIOLOGY

## 2021-07-21 PROCEDURE — 77063 BREAST TOMOSYNTHESIS BI: CPT

## 2021-07-21 PROCEDURE — 77067 SCR MAMMO BI INCL CAD: CPT

## 2021-08-18 ENCOUNTER — APPOINTMENT (OUTPATIENT)
Dept: MAMMOGRAPHY | Facility: HOSPITAL | Age: 41
End: 2021-08-18

## 2022-05-05 ENCOUNTER — OFFICE VISIT (OUTPATIENT)
Dept: FAMILY MEDICINE CLINIC | Facility: CLINIC | Age: 42
End: 2022-05-05

## 2022-05-05 ENCOUNTER — LAB (OUTPATIENT)
Dept: LAB | Facility: HOSPITAL | Age: 42
End: 2022-05-05

## 2022-05-05 VITALS
HEIGHT: 66 IN | WEIGHT: 166 LBS | DIASTOLIC BLOOD PRESSURE: 76 MMHG | SYSTOLIC BLOOD PRESSURE: 118 MMHG | OXYGEN SATURATION: 98 % | BODY MASS INDEX: 26.68 KG/M2 | RESPIRATION RATE: 20 BRPM | HEART RATE: 74 BPM | TEMPERATURE: 97 F

## 2022-05-05 DIAGNOSIS — Z12.31 ENCOUNTER FOR SCREENING MAMMOGRAM FOR MALIGNANT NEOPLASM OF BREAST: ICD-10-CM

## 2022-05-05 DIAGNOSIS — R53.82 CHRONIC FATIGUE: ICD-10-CM

## 2022-05-05 DIAGNOSIS — Z80.3 FAMILY HISTORY OF BREAST CANCER IN MOTHER: ICD-10-CM

## 2022-05-05 DIAGNOSIS — R59.9 LYMPH NODE ENLARGEMENT: Primary | ICD-10-CM

## 2022-05-05 DIAGNOSIS — Z83.3 FAMILY HISTORY OF DIABETES MELLITUS: ICD-10-CM

## 2022-05-05 PROBLEM — F41.1 GENERALIZED ANXIETY DISORDER: Status: ACTIVE | Noted: 2021-04-08

## 2022-05-05 LAB
25(OH)D3 SERPL-MCNC: 41.2 NG/ML (ref 30–100)
ALBUMIN SERPL-MCNC: 4.4 G/DL (ref 3.5–5.2)
ALBUMIN/GLOB SERPL: 1.5 G/DL
ALP SERPL-CCNC: 81 U/L (ref 39–117)
ALT SERPL W P-5'-P-CCNC: 15 U/L (ref 1–33)
ANION GAP SERPL CALCULATED.3IONS-SCNC: 13 MMOL/L (ref 5–15)
AST SERPL-CCNC: 22 U/L (ref 1–32)
BASOPHILS # BLD AUTO: 0.06 10*3/MM3 (ref 0–0.2)
BASOPHILS NFR BLD AUTO: 0.7 % (ref 0–1.5)
BILIRUB SERPL-MCNC: 0.2 MG/DL (ref 0–1.2)
BILIRUB UR QL STRIP: NEGATIVE
BUN SERPL-MCNC: 11 MG/DL (ref 6–20)
BUN/CREAT SERPL: 11.7 (ref 7–25)
CALCIUM SPEC-SCNC: 9.2 MG/DL (ref 8.6–10.5)
CHLORIDE SERPL-SCNC: 105 MMOL/L (ref 98–107)
CLARITY UR: CLEAR
CO2 SERPL-SCNC: 22 MMOL/L (ref 22–29)
COLOR UR: YELLOW
CREAT SERPL-MCNC: 0.94 MG/DL (ref 0.57–1)
DEPRECATED RDW RBC AUTO: 41.1 FL (ref 37–54)
EGFRCR SERPLBLD CKD-EPI 2021: 77.9 ML/MIN/1.73
EOSINOPHIL # BLD AUTO: 0.06 10*3/MM3 (ref 0–0.4)
EOSINOPHIL NFR BLD AUTO: 0.7 % (ref 0.3–6.2)
ERYTHROCYTE [DISTWIDTH] IN BLOOD BY AUTOMATED COUNT: 12 % (ref 12.3–15.4)
GLOBULIN UR ELPH-MCNC: 2.9 GM/DL
GLUCOSE SERPL-MCNC: 83 MG/DL (ref 65–99)
GLUCOSE UR STRIP-MCNC: NEGATIVE MG/DL
HAV IGM SERPL QL IA: NORMAL
HBA1C MFR BLD: 5.2 % (ref 4.8–5.6)
HBV CORE IGM SERPL QL IA: NORMAL
HBV SURFACE AG SERPL QL IA: NORMAL
HCT VFR BLD AUTO: 43.6 % (ref 34–46.6)
HCV AB SER DONR QL: NORMAL
HGB BLD-MCNC: 14.9 G/DL (ref 12–15.9)
HGB UR QL STRIP.AUTO: NEGATIVE
HIV1+2 AB SER QL: NORMAL
IMM GRANULOCYTES # BLD AUTO: 0.03 10*3/MM3 (ref 0–0.05)
IMM GRANULOCYTES NFR BLD AUTO: 0.3 % (ref 0–0.5)
IRON 24H UR-MRATE: 96 MCG/DL (ref 37–145)
IRON SATN MFR SERPL: 15 % (ref 20–50)
KETONES UR QL STRIP: NEGATIVE
LEUKOCYTE ESTERASE UR QL STRIP.AUTO: NEGATIVE
LYMPHOCYTES # BLD AUTO: 2.23 10*3/MM3 (ref 0.7–3.1)
LYMPHOCYTES NFR BLD AUTO: 24.9 % (ref 19.6–45.3)
MCH RBC QN AUTO: 31.9 PG (ref 26.6–33)
MCHC RBC AUTO-ENTMCNC: 34.2 G/DL (ref 31.5–35.7)
MCV RBC AUTO: 93.4 FL (ref 79–97)
MONOCYTES # BLD AUTO: 0.35 10*3/MM3 (ref 0.1–0.9)
MONOCYTES NFR BLD AUTO: 3.9 % (ref 5–12)
NEUTROPHILS NFR BLD AUTO: 6.24 10*3/MM3 (ref 1.7–7)
NEUTROPHILS NFR BLD AUTO: 69.5 % (ref 42.7–76)
NITRITE UR QL STRIP: NEGATIVE
NRBC BLD AUTO-RTO: 0 /100 WBC (ref 0–0.2)
PH UR STRIP.AUTO: 5.5 [PH] (ref 5–8)
PLATELET # BLD AUTO: 306 10*3/MM3 (ref 140–450)
PMV BLD AUTO: 10.4 FL (ref 6–12)
POTASSIUM SERPL-SCNC: 3.9 MMOL/L (ref 3.5–5.2)
PROT SERPL-MCNC: 7.3 G/DL (ref 6–8.5)
PROT UR QL STRIP: NEGATIVE
RBC # BLD AUTO: 4.67 10*6/MM3 (ref 3.77–5.28)
SODIUM SERPL-SCNC: 140 MMOL/L (ref 136–145)
SP GR UR STRIP: 1.02 (ref 1–1.03)
TIBC SERPL-MCNC: 630 MCG/DL (ref 298–536)
TRANSFERRIN SERPL-MCNC: 423 MG/DL (ref 200–360)
UROBILINOGEN UR QL STRIP: NORMAL
VIT B12 BLD-MCNC: 1208 PG/ML (ref 211–946)
WBC NRBC COR # BLD: 8.97 10*3/MM3 (ref 3.4–10.8)

## 2022-05-05 PROCEDURE — 82306 VITAMIN D 25 HYDROXY: CPT | Performed by: NURSE PRACTITIONER

## 2022-05-05 PROCEDURE — G0432 EIA HIV-1/HIV-2 SCREEN: HCPCS | Performed by: NURSE PRACTITIONER

## 2022-05-05 PROCEDURE — 82607 VITAMIN B-12: CPT | Performed by: NURSE PRACTITIONER

## 2022-05-05 PROCEDURE — 80074 ACUTE HEPATITIS PANEL: CPT | Performed by: NURSE PRACTITIONER

## 2022-05-05 PROCEDURE — 84466 ASSAY OF TRANSFERRIN: CPT | Performed by: NURSE PRACTITIONER

## 2022-05-05 PROCEDURE — 99214 OFFICE O/P EST MOD 30 MIN: CPT | Performed by: NURSE PRACTITIONER

## 2022-05-05 PROCEDURE — 80053 COMPREHEN METABOLIC PANEL: CPT | Performed by: NURSE PRACTITIONER

## 2022-05-05 PROCEDURE — 85025 COMPLETE CBC W/AUTO DIFF WBC: CPT | Performed by: NURSE PRACTITIONER

## 2022-05-05 PROCEDURE — 83540 ASSAY OF IRON: CPT | Performed by: NURSE PRACTITIONER

## 2022-05-05 PROCEDURE — 81003 URINALYSIS AUTO W/O SCOPE: CPT | Performed by: NURSE PRACTITIONER

## 2022-05-05 PROCEDURE — 83036 HEMOGLOBIN GLYCOSYLATED A1C: CPT | Performed by: NURSE PRACTITIONER

## 2022-05-05 RX ORDER — OMEPRAZOLE 40 MG/1
CAPSULE, DELAYED RELEASE ORAL
COMMUNITY
End: 2023-02-07

## 2022-05-05 RX ORDER — GLUCOSAMINE/D3/BOSWELLIA SERRA 1500MG-400
TABLET ORAL
COMMUNITY

## 2022-05-05 RX ORDER — LORATADINE 10 MG/1
TABLET ORAL
COMMUNITY

## 2022-05-05 RX ORDER — MULTIVIT WITH MINERALS/LUTEIN
TABLET ORAL
COMMUNITY

## 2022-05-05 NOTE — PROGRESS NOTES
Follow Up Office Note     Patient Name: Uriel Singleton  : 1980   MRN: [id number]     Chief Complaint:    Chief Complaint   Patient presents with   • Adenopathy       History of Present Illness: Uriel Singleton is a 42 y.o. female who presents today with c/o swelling and discomfort in lymph nodes in right axillary area and right inguinal area x several months. Patient also c/o excessive fatigue, weight gain, dry skin, hair falling out.    Patient requesting referral for genetic testing for BRCA gene due to reported history of breast cancer in mother (BRCA+). She is also requesting order for mammogram.    Answers for HPI/ROS submitted by the patient on 2022  Please describe your symptoms.: Want brca testing and to be scheduled earlier for a mammogram.  Have you had these symptoms before?: No  How long have you been having these symptoms?: Greater than 2 weeks  What is the primary reason for your visit?: Other      Subjective      Review of Systems:   Review of Systems   Constitutional: Positive for activity change, fatigue and unexpected weight change. Negative for appetite change, chills, diaphoresis and fever.   HENT: Negative.    Respiratory: Negative.    Cardiovascular: Negative for chest pain, palpitations and leg swelling.   Gastrointestinal: Negative.    Genitourinary: Negative.    Skin:        Dry skin   Neurological: Negative.    Hematological: Positive for adenopathy.        Past Medical History:   Past Medical History:   Diagnosis Date   • Anxiety      Patient's last menstrual period was 2022.    Medications:     Current Outpatient Medications:   •  cyclobenzaprine (FLEXERIL) 5 MG tablet, Take 1 tablet by mouth 3 (Three) Times a Day As Needed for Muscle Spasms., Disp: 30 tablet, Rfl: 0  •  meclizine (ANTIVERT) 25 MG tablet, Take 1 tablet by mouth 2 (Two) Times a Day As Needed for dizziness or Nausea., Disp: 30 tablet, Rfl: 0  •  Norethindrone-Eth Estradiol (PIRMELLA   "PO), Take  by mouth., Disp: , Rfl:   •  omeprazole (priLOSEC) 40 MG capsule, omeprazole 40 mg capsule,delayed release  TAKE ONE CAPSULE BY MOUTH EVERY MORNING, Disp: , Rfl:   •  ascorbic acid (VITAMIN C) 1000 MG tablet, Vitamin C  1000 once a day, Disp: , Rfl:   •  Biotin 05096 MCG tablet, biotin, Disp: , Rfl:   •  loratadine (Claritin) 10 MG tablet, Claritin 10 mg tablet  Take 1 tablet every day by oral route., Disp: , Rfl:     Allergies:   Allergies   Allergen Reactions   • Azithromycin    • Cefdinir Other (See Comments)   • Cefuroxime    • Ciprofloxacin Other (See Comments)   • Erythromycin    • Fish-Derived Products Nausea And Vomiting   • Sulfa Antibiotics Other (See Comments)   • Tetracycline    • Latex, Natural Rubber Rash         Objective     Physical Exam:  Vital Signs:   Vitals:    05/05/22 0836   BP: 118/76   Pulse: 74   Resp: 20   Temp: 97 °F (36.1 °C)   SpO2: 98%   Weight: 75.3 kg (166 lb)   Height: 167.6 cm (66\")   PainSc: 0-No pain     Body mass index is 26.79 kg/m².     Physical Exam  Vitals and nursing note reviewed.   Constitutional:       General: She is not in acute distress.     Appearance: Normal appearance. She is well-developed. She is not ill-appearing, toxic-appearing or diaphoretic.   HENT:      Head: Normocephalic and atraumatic.   Cardiovascular:      Rate and Rhythm: Normal rate and regular rhythm.      Heart sounds: Normal heart sounds.   Pulmonary:      Effort: Pulmonary effort is normal. No respiratory distress.      Breath sounds: Normal breath sounds. No stridor. No wheezing.   Chest:   Breasts:      Right: Axillary adenopathy present. No supraclavicular adenopathy.      Left: No axillary adenopathy or supraclavicular adenopathy.       Musculoskeletal:      Cervical back: Normal range of motion and neck supple.   Lymphadenopathy:      Head:      Right side of head: No submental, submandibular, tonsillar, preauricular, posterior auricular or occipital adenopathy.      Left side of " head: No submental, submandibular, tonsillar, preauricular, posterior auricular or occipital adenopathy.      Cervical: No cervical adenopathy.      Right cervical: No superficial, deep or posterior cervical adenopathy.     Left cervical: No superficial, deep or posterior cervical adenopathy.      Upper Body:      Right upper body: Axillary adenopathy present. No supraclavicular or pectoral adenopathy.      Left upper body: No supraclavicular, axillary or pectoral adenopathy.      Lower Body: Right inguinal adenopathy present. No left inguinal adenopathy.   Skin:     General: Skin is warm and dry.   Neurological:      General: No focal deficit present.      Mental Status: She is alert and oriented to person, place, and time.   Psychiatric:         Mood and Affect: Mood normal.         Behavior: Behavior normal. Behavior is cooperative.         Thought Content: Thought content normal.         Judgment: Judgment normal.         Assessment / Plan      Assessment/Plan:   Diagnoses and all orders for this visit:    1. Lymph node enlargement (Primary)  -     Comprehensive Metabolic Panel; Future  -     HIV-1 / O / 2 Ag / Antibody 4th Generation; Future  -     Urinalysis With Culture If Indicated - Urine, Clean Catch; Future  -     Hepatitis Panel, Acute; Future  -     US nonvascular extremity limited; Future  -     US nonvascular extremity limited; Future  -     Comprehensive Metabolic Panel  -     HIV-1 / O / 2 Ag / Antibody 4th Generation  -     Urinalysis With Culture If Indicated - Urine, Clean Catch  -     Hepatitis Panel, Acute    2. Chronic fatigue  -     Comprehensive Metabolic Panel; Future  -     HIV-1 / O / 2 Ag / Antibody 4th Generation; Future  -     Iron Profile; Future  -     CBC Auto Differential; Future  -     Vitamin B12; Future  -     Vitamin D 25 Hydroxy; Future  -     Hepatitis Panel, Acute; Future  -     Comprehensive Metabolic Panel  -     HIV-1 / O / 2 Ag / Antibody 4th Generation  -     Iron  Profile  -     CBC Auto Differential  -     Vitamin B12  -     Vitamin D 25 Hydroxy  -     Hepatitis Panel, Acute    3. Encounter for screening mammogram for malignant neoplasm of breast  -     Mammo Screening Digital Tomosynthesis Bilateral With CAD; Future    4. Family history of diabetes mellitus  -     Hemoglobin A1c; Future  -     Hemoglobin A1c    5. Family history of breast cancer in mother  -     Ambulatory Referral to Genetic Counseling/Testing - [facility]       Continue current medications.  Laboratory studies and diagnostic imaging per orders, further recommendation after results evaluation.    Follow Up:   PRN and at next scheduled appointment(s) with PCP.    Discussed the nature of the medical condition(s) risks, complications, implications, management, safe and proper use of medications. Encouraged medication compliance, and keeping scheduled follow up appointments with me and any other providers.      RTC if symptoms fail to improve, to ER if symptoms worsen.      GRECIA Gutierrez  [facility] [place]

## 2022-05-09 ENCOUNTER — HOSPITAL ENCOUNTER (OUTPATIENT)
Dept: ULTRASOUND IMAGING | Facility: HOSPITAL | Age: 42
Discharge: HOME | End: 2022-05-09

## 2022-05-09 ENCOUNTER — TELEPHONE (OUTPATIENT)
Dept: FAMILY MEDICINE CLINIC | Facility: CLINIC | Age: 42
End: 2022-05-09

## 2022-05-09 DIAGNOSIS — R59.9 LYMPH NODE ENLARGEMENT: ICD-10-CM

## 2022-05-09 PROCEDURE — 76882 US LMTD JT/FCL EVL NVASC XTR: CPT

## 2022-05-09 NOTE — TELEPHONE ENCOUNTER
Caller: Uriel Singleton    Relationship: Self    Best call back number: [phone]    Who are you requesting to speak with (clinical staff, provider,  specific staff member): CLINICAL        What was the call regarding: MAMMOGRAM     Do you require a callback: PLEASE CALL

## 2022-05-20 DIAGNOSIS — R53.82 CHRONIC FATIGUE: Primary | ICD-10-CM

## 2022-05-23 ENCOUNTER — LAB (OUTPATIENT)
Dept: LAB | Facility: HOSPITAL | Age: 42
End: 2022-05-23

## 2022-05-23 DIAGNOSIS — R53.82 CHRONIC FATIGUE: ICD-10-CM

## 2022-05-23 LAB
T4 FREE SERPL-MCNC: 1.24 NG/DL (ref 0.93–1.7)
TSH SERPL DL<=0.05 MIU/L-ACNC: 1.51 UIU/ML (ref 0.27–4.2)

## 2022-05-23 PROCEDURE — 84439 ASSAY OF FREE THYROXINE: CPT

## 2022-05-23 PROCEDURE — 84443 ASSAY THYROID STIM HORMONE: CPT

## 2022-07-22 ENCOUNTER — HOSPITAL ENCOUNTER (OUTPATIENT)
Dept: MAMMOGRAPHY | Facility: HOSPITAL | Age: 42
Discharge: HOME | End: 2022-07-22
Admitting: NURSE PRACTITIONER

## 2022-07-22 DIAGNOSIS — Z12.31 ENCOUNTER FOR SCREENING MAMMOGRAM FOR MALIGNANT NEOPLASM OF BREAST: ICD-10-CM

## 2022-07-22 PROCEDURE — 77063 BREAST TOMOSYNTHESIS BI: CPT

## 2022-07-22 PROCEDURE — 77067 SCR MAMMO BI INCL CAD: CPT

## 2022-07-22 PROCEDURE — 77063 BREAST TOMOSYNTHESIS BI: CPT | Performed by: RADIOLOGY

## 2022-07-22 PROCEDURE — 77067 SCR MAMMO BI INCL CAD: CPT | Performed by: RADIOLOGY

## 2023-01-10 ENCOUNTER — APPOINTMENT (OUTPATIENT)
Dept: GENETICS | Facility: HOSPITAL | Age: 43
End: 2023-01-10

## 2023-02-07 ENCOUNTER — OFFICE VISIT (OUTPATIENT)
Dept: OBSTETRICS AND GYNECOLOGY | Facility: CLINIC | Age: 43
End: 2023-02-07
Payer: COMMERCIAL

## 2023-02-07 VITALS
HEIGHT: 66 IN | SYSTOLIC BLOOD PRESSURE: 108 MMHG | WEIGHT: 169.2 LBS | BODY MASS INDEX: 27.19 KG/M2 | DIASTOLIC BLOOD PRESSURE: 72 MMHG

## 2023-02-07 DIAGNOSIS — Z30.41 ENCOUNTER FOR SURVEILLANCE OF CONTRACEPTIVE PILLS: ICD-10-CM

## 2023-02-07 DIAGNOSIS — Z01.419 WOMEN'S ANNUAL ROUTINE GYNECOLOGICAL EXAMINATION: Primary | ICD-10-CM

## 2023-02-07 PROCEDURE — 99396 PREV VISIT EST AGE 40-64: CPT | Performed by: NURSE PRACTITIONER

## 2023-02-07 RX ORDER — FAMOTIDINE 20 MG/1
TABLET, FILM COATED ORAL
COMMUNITY
Start: 2023-01-30

## 2023-02-07 RX ORDER — NORETHINDRONE AND ETHINYL ESTRADIOL 1 MG-35MCG
1 KIT ORAL DAILY
Qty: 84 TABLET | Refills: 4 | Status: SHIPPED | OUTPATIENT
Start: 2023-02-07 | End: 2024-02-07

## 2023-02-07 RX ORDER — ALPRAZOLAM 0.5 MG/1
TABLET ORAL
COMMUNITY
Start: 2023-01-23

## 2023-02-07 NOTE — PROGRESS NOTES
Gynecologic Annual Exam Note          GYN Annual Exam     Annual        Subjective     HPI  Uriel Singleton is a 42 y.o. female, No obstetric history on file., who presents for annual well woman exam as a new patient . Patient's last menstrual period was 01/24/2023 (approximate)..  Patient reports problems with: none.  Her periods occur every 25-35 days , lasting 3-4 days. The flow is light.. She reports dysmenorrhea is moderate, occurring first 1-2 days of flow. Partner Status: Marital Status: . She is is sexually active. She has not had new partners. STD testing recommendations have been explained to the patient and she does not desire STD testing.        Additional OB/GYN History   Current contraception: contraceptive methods: OCP (estrogen/progesterone)  Desires to: Discuss contraception    Last Pap : 2022 with [facility]. Result: abnormal. HPV: negative.   Last Completed Pap Smear     This patient has no relevant Health Maintenance data.        History of abnormal Pap smear: yes - abnormal pap 1/2022- HPV negative w/ [facility].  Also had previous abnormal 2013, colpo following.   Family history of uterine, colon, breast, or ovarian cancer: yes - Maternal aunt and MGM- Breast  Performs monthly Self-Breast Exam: no  Last mammogram: 7/22/2022. Done at [facility].    Last Completed Mammogram     This patient has no relevant Health Maintenance data.          History of abnormal mammogram: no    Colonoscopy: has never had a colonoscopy.  Exercises Regularly: no  Feelings of Anxiety or Depression: yes - anxiety  Tobacco Usage?: No       Current Outpatient Medications:   •  ALPRAZolam (XANAX) 0.5 MG tablet, , Disp: , Rfl:   •  ascorbic acid (VITAMIN C) 1000 MG tablet, Vitamin C  1000 once a day, Disp: , Rfl:   •  Biotin 83742 MCG tablet, biotin, Disp: , Rfl:   •  cyclobenzaprine (FLEXERIL) 5 MG tablet, Take 1 tablet by mouth 3 (Three) Times a Day As Needed for Muscle Spasms., Disp: 30 tablet, Rfl: 0  •   "famotidine (PEPCID) 20 MG tablet, , Disp: , Rfl:   •  loratadine (CLARITIN) 10 MG tablet, Claritin 10 mg tablet  Take 1 tablet every day by oral route., Disp: , Rfl:   •  Norethindrone-Eth Estradiol (PIRMELLA  PO), Take  by mouth., Disp: , Rfl:   •  vitamin D3 125 MCG (5000 UT) capsule capsule, Take 5,000 Units by mouth Daily., Disp: , Rfl:   •  VITAMIN E PO, Take  by mouth., Disp: , Rfl:      Patient is requesting refills of OCP.    OB History             Para        Term   0            AB        Living           SAB        IAB        Ectopic        Molar        Multiple        Live Births                    Past Medical History:   Diagnosis Date   • Anxiety         Past Surgical History:   Procedure Laterality Date   • TONSILLECTOMY         Health Maintenance   Topic Date Due   • Annual Gynecologic Pelvic and Breast Exam  Never done   • COVID-19 Vaccine (1) Never done   • TDAP/TD VACCINES (1 - Tdap) Never done   • PAP SMEAR  Never done   • ANNUAL PHYSICAL  2020   • INFLUENZA VACCINE  Never done   • HEPATITIS C SCREENING  Completed   • Pneumococcal Vaccine 0-64  Aged Out       The additional following portions of the patient's history were reviewed and updated as appropriate: allergies, current medications, past family history, past medical history, past social history, past surgical history and problem list.    Review of Systems   Constitutional: Negative.    HENT: Negative.    Eyes: Negative.    Respiratory: Negative.    Cardiovascular: Negative.    Gastrointestinal: Negative.    Endocrine: Negative.    Genitourinary: Negative.    Musculoskeletal: Negative.    Skin: Negative.    Allergic/Immunologic: Negative.    Neurological: Negative.    Hematological: Negative.    Psychiatric/Behavioral: Negative.          I have reviewed and agree with the HPI, ROS, and historical information as entered above. Jorge Alfaro, APRN      Objective   /72   Ht 167.6 cm (66\")   Wt 76.7 kg " (169 lb 3.2 oz)   LMP 01/24/2023 (Approximate)   BMI 27.31 kg/m²     Physical Exam  Vitals and nursing note reviewed. Exam conducted with a chaperone present.   Constitutional:       Appearance: Normal appearance. She is well-developed and normal weight.   HENT:      Head: Normocephalic and atraumatic.   Neck:      Thyroid: No thyroid mass or thyromegaly.   Cardiovascular:      Rate and Rhythm: Normal rate and regular rhythm.      Heart sounds: No murmur heard.  Pulmonary:      Effort: Pulmonary effort is normal. No retractions.      Breath sounds: Normal breath sounds. No wheezing, rhonchi or rales.   Chest:      Chest wall: No mass or tenderness.   Breasts:     Right: Normal. No mass, nipple discharge, skin change or tenderness.      Left: Normal. No mass, nipple discharge, skin change or tenderness.   Abdominal:      Palpations: Abdomen is soft. Abdomen is not rigid. There is no mass.      Tenderness: There is no abdominal tenderness. There is no guarding.      Hernia: No hernia is present.   Genitourinary:     General: Normal vulva.      Exam position: Lithotomy position.      Labia:         Right: No rash, tenderness or lesion.         Left: No rash, tenderness or lesion.       Vagina: Normal. No vaginal discharge or lesions.      Cervix: No cervical motion tenderness, discharge, lesion or cervical bleeding.      Uterus: Normal. Not enlarged, not fixed and not tender.       Adnexa: Right adnexa normal and left adnexa normal.        Right: No mass or tenderness.          Left: No mass or tenderness.        Rectum: Normal. No external hemorrhoid.   Musculoskeletal:      Cervical back: Normal range of motion. No muscular tenderness.   Neurological:      Mental Status: She is alert and oriented to person, place, and time.   Psychiatric:         Behavior: Behavior normal.            Assessment and Plan    Problem List Items Addressed This Visit    None  Visit Diagnoses     Women's annual routine gynecological  examination    -  Primary    Relevant Orders    LIQUID-BASED PAP SMEAR, [facility] ([facility])          1. GYN annual well woman exam.   2. Pap guidelines reviewed.  3. Reviewed risks/benefits of hormonal contraception after age 35, including possible increased risk of breast cancer, heart disease, blood clots and strokes.  Patient strongly desires to stay on hormonal contraception.  4. Reviewed monthly self breast exams.  Instructed to call with lumps, pain, or breast discharge.    5. Reviewed BMI and weight loss as preventative health measures.   6. Reviewed exercise as a preventative health measures.   7. Reccommended Flu Vaccine in Fall of each year.  8. Symptoms of menopausal transition reviewed with patient.   9. RTC in 1 year or PRN with problems.      Jorge Alfaro, APRN  02/07/2023

## 2023-02-09 LAB — REF LAB TEST METHOD: NORMAL

## 2023-08-04 ENCOUNTER — HOSPITAL ENCOUNTER (OUTPATIENT)
Dept: MAMMOGRAPHY | Facility: HOSPITAL | Age: 43
Discharge: HOME | End: 2023-08-04
Admitting: NURSE PRACTITIONER
Payer: COMMERCIAL

## 2023-08-04 DIAGNOSIS — Z12.31 VISIT FOR SCREENING MAMMOGRAM: ICD-10-CM

## 2023-08-04 PROCEDURE — 77067 SCR MAMMO BI INCL CAD: CPT

## 2023-08-04 PROCEDURE — 77063 BREAST TOMOSYNTHESIS BI: CPT

## 2023-08-08 ENCOUNTER — OFFICE VISIT (OUTPATIENT)
Dept: FAMILY MEDICINE CLINIC | Facility: CLINIC | Age: 43
End: 2023-08-08
Payer: COMMERCIAL

## 2023-08-08 VITALS
TEMPERATURE: 97.8 F | SYSTOLIC BLOOD PRESSURE: 122 MMHG | OXYGEN SATURATION: 98 % | WEIGHT: 173 LBS | RESPIRATION RATE: 20 BRPM | HEART RATE: 76 BPM | BODY MASS INDEX: 27.92 KG/M2 | DIASTOLIC BLOOD PRESSURE: 90 MMHG

## 2023-08-08 DIAGNOSIS — B37.31 YEAST VAGINITIS: ICD-10-CM

## 2023-08-08 DIAGNOSIS — J02.9 SORE THROAT: Primary | ICD-10-CM

## 2023-08-08 DIAGNOSIS — E04.1 CYST OF THYROID: ICD-10-CM

## 2023-08-08 PROCEDURE — 99214 OFFICE O/P EST MOD 30 MIN: CPT | Performed by: FAMILY MEDICINE

## 2023-08-08 RX ORDER — OMEPRAZOLE 40 MG/1
40 CAPSULE, DELAYED RELEASE ORAL DAILY
COMMUNITY

## 2023-08-08 RX ORDER — FLUCONAZOLE 150 MG/1
150 TABLET ORAL ONCE
Qty: 1 TABLET | Refills: 0 | Status: SHIPPED | OUTPATIENT
Start: 2023-08-08 | End: 2023-08-16 | Stop reason: SDUPTHER

## 2023-08-08 RX ORDER — AMOXICILLIN AND CLAVULANATE POTASSIUM 875; 125 MG/1; MG/1
1 TABLET, FILM COATED ORAL 2 TIMES DAILY
Qty: 20 TABLET | Refills: 0 | Status: SHIPPED | OUTPATIENT
Start: 2023-08-08

## 2023-08-08 NOTE — PROGRESS NOTES
Subjective   Uriel Singleton is a 43 y.o. female.     Sore Throat   Pertinent negatives include no abdominal pain, congestion, coughing, diarrhea, ear pain, headaches, shortness of breath, trouble swallowing or vomiting.  she is having a sore throat since this weekend.  She reports she has had swollen lymph nodes that have ramonita in axilla us of lymph nodes normal.  Saw holistic pa that did blood work. She had fatigue and hair loss and weight gain.  She was started on MP thyroid and was told she had low testosterone.  This year she saw the for xanax refills and swollen lymph nodes.  She has seen someone in last few months.  She had blood work and thyroid ultrasound. She was told she had colloid cyst on thyroid last week and had mammogram.  She has taken 2 augmentin.  She is requesting to see endo.      She has an appt with [facility] nov 29.      She reports the node on neck comes and goes continues to have node in arm pit and had 2 negative ultrasounds.      The following portions of the patient's history were reviewed and updated as appropriate: allergies, current medications, past family history, past medical history, past social history, past surgical history, and problem list.    Review of Systems   Constitutional: Negative.  Negative for chills, fatigue, fever and unexpected weight change.   HENT:  Positive for sore throat. Negative for congestion, ear pain, nosebleeds, rhinorrhea, sinus pressure, sneezing and trouble swallowing.    Eyes: Negative.  Negative for itching and visual disturbance.   Respiratory: Negative.  Negative for cough, chest tightness, shortness of breath and wheezing.    Cardiovascular: Negative.  Negative for chest pain, palpitations and leg swelling.   Gastrointestinal: Negative.  Negative for abdominal pain, anal bleeding, blood in stool, constipation, diarrhea, nausea and vomiting.   Endocrine: Negative.  Negative for cold intolerance, heat intolerance, polydipsia and polyuria.    Genitourinary: Negative.  Negative for difficulty urinating, frequency, hematuria and urgency.   Musculoskeletal: Negative.  Negative for back pain, gait problem and myalgias.   Skin: Negative.  Negative for rash and wound.   Allergic/Immunologic: Negative.    Neurological: Negative.  Negative for dizziness, weakness, numbness and headaches.   Hematological:  Positive for adenopathy. Does not bruise/bleed easily.   Psychiatric/Behavioral: Negative.  Negative for agitation, confusion, decreased concentration and suicidal ideas. The patient is not nervous/anxious.    All other systems reviewed and are negative.    Objective     Vitals:    08/08/23 1538   BP: 122/90   Pulse: 76   Resp: 20   Temp: 97.8 øF (36.6 øC)   SpO2: 98%   Weight: 78.5 kg (173 lb)       Physical Exam  Vitals and nursing note reviewed.   Constitutional:       Appearance: She is well-developed.   HENT:      Head: Normocephalic and atraumatic.   Eyes:      General:         Right eye: No discharge.         Left eye: No discharge.      Pupils: Pupils are equal, round, and reactive to light.   Cardiovascular:      Rate and Rhythm: Normal rate and regular rhythm.      Heart sounds: Normal heart sounds.   Pulmonary:      Effort: Pulmonary effort is normal.      Breath sounds: Normal breath sounds.   Abdominal:      General: Bowel sounds are normal.      Palpations: Abdomen is soft. There is no mass.      Tenderness: There is no abdominal tenderness.   Musculoskeletal:         General: Normal range of motion.      Right shoulder: No swelling.      Cervical back: Normal range of motion and neck supple.   Skin:     General: Skin is warm and dry.      Nails: There is no clubbing.   Neurological:      Mental Status: She is alert and oriented to person, place, and time.      Deep Tendon Reflexes: Reflexes are normal and symmetric.   Psychiatric:         Behavior: Behavior normal.         Thought Content: Thought content normal.         Judgment: Judgment  normal.       Assessment & Plan     Problem List Items Addressed This Visit          ENT    Sore throat - Primary    Relevant Medications    amoxicillin-clavulanate (AUGMENTIN) 875-125 MG per tablet    Other Relevant Orders    POCT rapid strep A     Other Visit Diagnoses       Cyst of thyroid        Relevant Orders    Ambulatory Referral to Endocrinology    Yeast vaginitis        Relevant Medications    fluconazole (Diflucan) 150 MG tablet          Rapid strept negative.

## 2023-08-16 DIAGNOSIS — B37.31 YEAST VAGINITIS: ICD-10-CM

## 2023-08-16 PROBLEM — E04.1 CYST OF THYROID: Status: ACTIVE | Noted: 2023-08-16

## 2023-08-16 RX ORDER — FLUCONAZOLE 150 MG/1
150 TABLET ORAL ONCE
Qty: 1 TABLET | Refills: 0 | Status: SHIPPED | OUTPATIENT
Start: 2023-08-16 | End: 2023-08-16

## 2024-07-23 ENCOUNTER — LAB (OUTPATIENT)
Dept: LAB | Facility: HOSPITAL | Age: 44
End: 2024-07-23
Payer: COMMERCIAL

## 2024-07-23 ENCOUNTER — TRANSCRIBE ORDERS (OUTPATIENT)
Dept: LAB | Facility: HOSPITAL | Age: 44
End: 2024-07-23
Payer: COMMERCIAL

## 2024-07-23 DIAGNOSIS — N91.2 ABSENCE OF MENSTRUATION: ICD-10-CM

## 2024-07-23 DIAGNOSIS — N91.2 ABSENCE OF MENSTRUATION: Primary | ICD-10-CM

## 2024-07-23 LAB
ESTRADIOL SERPL HS-MCNC: 114 PG/ML
FSH SERPL-ACNC: 6.68 MIU/ML

## 2024-07-23 PROCEDURE — 82670 ASSAY OF TOTAL ESTRADIOL: CPT

## 2024-07-23 PROCEDURE — 36415 COLL VENOUS BLD VENIPUNCTURE: CPT

## 2024-07-23 PROCEDURE — 83001 ASSAY OF GONADOTROPIN (FSH): CPT

## 2024-09-10 ENCOUNTER — TRANSCRIBE ORDERS (OUTPATIENT)
Dept: ADMINISTRATIVE | Facility: HOSPITAL | Age: 44
End: 2024-09-10
Payer: COMMERCIAL

## 2024-09-10 DIAGNOSIS — Z12.31 VISIT FOR SCREENING MAMMOGRAM: Primary | ICD-10-CM

## 2024-09-23 LAB
NCCN CRITERIA FLAG: NORMAL
TYRER CUZICK SCORE: 17.9

## 2024-10-08 ENCOUNTER — HOSPITAL ENCOUNTER (OUTPATIENT)
Dept: MAMMOGRAPHY | Facility: HOSPITAL | Age: 44
Discharge: HOME | End: 2024-10-08
Admitting: STUDENT IN AN ORGANIZED HEALTH CARE EDUCATION/TRAINING PROGRAM
Payer: COMMERCIAL

## 2024-10-08 DIAGNOSIS — Z12.31 VISIT FOR SCREENING MAMMOGRAM: ICD-10-CM

## 2024-10-08 PROCEDURE — 77063 BREAST TOMOSYNTHESIS BI: CPT

## 2024-10-08 PROCEDURE — 77067 SCR MAMMO BI INCL CAD: CPT

## 2025-03-25 ENCOUNTER — PATIENT ROUNDING (BHMG ONLY) (OUTPATIENT)
Dept: INTERNAL MEDICINE | Facility: CLINIC | Age: 45
End: 2025-03-25
Payer: COMMERCIAL

## 2025-03-25 NOTE — PROGRESS NOTES
My name is Yulia Restrepo and I am a patient experience representative for [facility] on [facility].    I would like to officially welcome you to our practice.  If you do not mind I would like to ask you a few questions about your recent visit with us.  If you do not have the time to reply that is perfectly fine.      First, could you tell me what went well with your recent visit?     Secondly, we are always looking for ways to make our patients' experiences even better. Do you have any recommendations on ways we may improve?     Third, safety is a top priority therefore do you have any concerns with that while in our office?    Finally, overall were you satisfied with your first visit to our practice?     Thank you for taking the time to answer a few questions today.  In the coming days you will receive a survey.  We encourage you to complete the survey to let us know how we did!        Yulia

## 2025-03-27 ENCOUNTER — OFFICE VISIT (OUTPATIENT)
Dept: INTERNAL MEDICINE | Facility: CLINIC | Age: 45
End: 2025-03-27
Payer: COMMERCIAL

## 2025-03-27 VITALS
BODY MASS INDEX: 25.78 KG/M2 | DIASTOLIC BLOOD PRESSURE: 72 MMHG | SYSTOLIC BLOOD PRESSURE: 128 MMHG | HEART RATE: 79 BPM | HEIGHT: 66 IN | TEMPERATURE: 97.8 F | OXYGEN SATURATION: 96 % | WEIGHT: 160.4 LBS

## 2025-03-27 DIAGNOSIS — F41.9 ANXIETY: Primary | ICD-10-CM

## 2025-03-27 DIAGNOSIS — Z12.31 SCREENING MAMMOGRAM FOR BREAST CANCER: ICD-10-CM

## 2025-03-27 RX ORDER — TESTOSTERONE 100 MG
PELLET (EA) IMPLANTATION
COMMUNITY

## 2025-03-27 RX ORDER — ALPRAZOLAM 0.5 MG
0.5 TABLET ORAL 2 TIMES DAILY
Qty: 60 TABLET | Refills: 0 | Status: SHIPPED | OUTPATIENT
Start: 2025-03-27

## 2025-03-27 NOTE — PROGRESS NOTES
Office Note     Name: Uriel Singleton    : 1980     MRN: [id number]     Chief Complaint  Establish Care, Depression (PHQ 13 ), Anxiety (PHQ 16 ), and Med Refill    Subjective     History of Present Illness:  Uriel Singleton is a 45 y.o. female who presents today for       evaluation of anxiety.  She has the following anxiety symptoms: difficulty concentrating, feelings of losing control, irritable, palpitations, racing thoughts. Onset of symptoms was approximately a few years ago.  Symptoms have been stable since that time. She denies current suicidal and homicidal ideation. Family history significant for anxiety and depression.Possible organic causes contributing are: none. Risk factors: previous episode of depression Previous treatment includes individual therapy and medication benzodiazepines xanax 0.5mg   .   She complains of the following medication side effects: none.        PHQ-9 Depression Screening  Little interest or pleasure in doing things? More than half the days   Feeling down, depressed, or hopeless? More than half the days   PHQ-2 Total Score 4   Trouble falling or staying asleep, or sleeping too much? More than half the days   Feeling tired or having little energy? Nearly every day   Poor appetite or overeating? Not at all   Feeling bad about yourself - or that you are a failure or have let yourself or your family down? More than half the days   Trouble concentrating on things, such as reading the newspaper or watching television? More than half the days   Moving or speaking so slowly that other people could have noticed? Or the opposite - being so fidgety or restless that you have been moving around a lot more than usual? Not at all     Thoughts that you would be better off dead, or of hurting yourself in some way? Not at all   PHQ-9 Total Score 13   If you checked off any problems, how difficult have these problems made it for you to do your work, take care of things at home,  or get along with other people? Not difficult at all         Anxiety SAMSON-7  Feeling nervous, anxious or on edge: Nearly every day  Not being able to stop or control worrying: Nearly every day  Worrying too much about different things: Nearly every day  Trouble Relaxing: Several days  Being so restless that it is hard to sit still: Not at all  Becoming easily annoyed or irritable: Nearly every day  Feeling afraid as if something awful might happen: Nearly every day  SAMSON 7 Total Score: 16  If you checked any problems, how difficult have these problems made it for you to do your work, take care of things at home, or get along with other people: Extremely difficult           Review of Systems:   Review of Systems   All other systems reviewed and are negative.      Past Medical History:   Past Medical History:   Diagnosis Date    Anxiety     Arthritis     GERD (gastroesophageal reflux disease) 2021    Headache     Low back pain 1995    Scoliosis        Past Surgical History:   Past Surgical History:   Procedure Laterality Date    COSMETIC SURGERY  2015    Ear correction    TONSILLECTOMY      TONSILLECTOMY  1991       Family History:   Family History   Problem Relation Age of Onset    Arthritis Mother     Cancer Mother     Thyroid disease Mother     Anxiety disorder Father     Arthritis Father     Hyperlipidemia Father     Hypertension Father     Breast cancer Maternal Grandmother 70    Cancer Maternal Grandmother     Heart disease Maternal Grandmother     Breast cancer Maternal Aunt         late 40's early 50's    Cancer Maternal Aunt     Arthritis Paternal Grandmother     Hyperlipidemia Paternal Grandmother     Ovarian cancer Neg Hx        Social History:   Social History     Socioeconomic History    Marital status:    Tobacco Use    Smoking status: Former     Current packs/day: 0.00     Average packs/day: 0.5 packs/day for 20.0 years (10.0 ttl pk-yrs)     Types: Cigarettes     Start date: 1/1/1995     Quit date:  2013     Years since quittin.2    Smokeless tobacco: Never   Vaping Use    Vaping status: Never Used   Substance and Sexual Activity    Alcohol use: Yes     Alcohol/week: 1.0 standard drink of alcohol     Types: 1 Cans of beer per week     Comment: Rare    Drug use: No    Sexual activity: Yes     Partners: Male     Birth control/protection: Vasectomy       Immunizations:   There is no immunization history on file for this patient.     Medications:     Current Outpatient Medications:     ALPRAZolam (XANAX) 0.5 MG tablet, Take 1 tablet by mouth 2 (Two) Times a Day., Disp: 60 tablet, Rfl: 0    ascorbic acid (VITAMIN C) 1000 MG tablet, Vitamin C  1000 once a day, Disp: , Rfl:     Biotin 58766 MCG tablet, biotin (Patient taking differently: Take 5,000 mcg by mouth Daily.), Disp: , Rfl:     cyclobenzaprine (FLEXERIL) 5 MG tablet, Take 1 tablet by mouth 3 (Three) Times a Day As Needed for Muscle Spasms., Disp: 30 tablet, Rfl: 0    loratadine (CLARITIN) 10 MG tablet, Claritin 10 mg tablet  Take 1 tablet every day by oral route., Disp: , Rfl:     PROGESTERONE PO, Take 220 mg by mouth Daily As Needed., Disp: , Rfl:     Testosterone 100 MG pellet, by Implant route., Disp: , Rfl:     Vitamin D-Vitamin K (VITAMIN K2-VITAMIN D3 PO), Take 5,000 Units by mouth Daily., Disp: , Rfl:     amoxicillin-clavulanate (AUGMENTIN) 875-125 MG per tablet, Take 1 tablet by mouth 2 (Two) Times a Day. (Patient not taking: Reported on 3/27/2025), Disp: 20 tablet, Rfl: 0    norethindrone-ethinyl estradiol (Pirmella ) 1-35 MG-MCG per tablet, Take 1 tablet by mouth Daily., Disp: 84 tablet, Rfl: 4    omeprazole (priLOSEC) 40 MG capsule, Take 1 capsule by mouth Daily. (Patient not taking: Reported on 3/27/2025), Disp: , Rfl:     Allergies:   Allergies   Allergen Reactions    Azithromycin     Cefdinir Other (See Comments)    Cefuroxime     Ciprofloxacin Other (See Comments)    Erythromycin     Fish-Derived Products Nausea And Vomiting     "Latex Unknown - Low Severity    Sulfa Antibiotics Other (See Comments)    Tetracycline     Latex, Natural Rubber Rash       Objective     Vital Signs  /72   Pulse 79   Temp 97.8 °F (36.6 °C) (Temporal)   Ht 167.6 cm (65.98\")   Wt 72.8 kg (160 lb 6.4 oz)   SpO2 96%   BMI 25.90 kg/m²   Estimated body mass index is 25.9 kg/m² as calculated from the following:    Height as of this encounter: 167.6 cm (65.98\").    Weight as of this encounter: 72.8 kg (160 lb 6.4 oz).          Physical Exam  Constitutional:       Appearance: Normal appearance.   Cardiovascular:      Rate and Rhythm: Normal rate and regular rhythm.      Pulses: Normal pulses.      Heart sounds: Normal heart sounds.   Pulmonary:      Effort: Pulmonary effort is normal.      Breath sounds: Normal breath sounds.   Neurological:      Mental Status: She is alert.   Psychiatric:         Mood and Affect: Mood normal.         Behavior: Behavior normal.          Result Review :                  Assessment and Plan     1. Anxiety  Chronic medication from her prior psyc  Continue with xanax 0.5mg BID, stable    - ALPRAZolam (XANAX) 0.5 MG tablet; Take 1 tablet by mouth 2 (Two) Times a Day.  Dispense: 60 tablet; Refill: 0    2. Screening mammogram for breast cancer    - Mammo Screening Digital Tomosynthesis Bilateral With CAD; Future       Follow Up  Return in about 3 months (around 6/27/2025).    Ruslan Harrell MD  [facility]  [facility]  [address] [facility]  [address] [id number] [phone]    "

## 2025-04-14 ENCOUNTER — OFFICE VISIT (OUTPATIENT)
Dept: INTERNAL MEDICINE | Facility: CLINIC | Age: 45
End: 2025-04-14
Payer: COMMERCIAL

## 2025-04-14 VITALS
HEIGHT: 66 IN | OXYGEN SATURATION: 96 % | BODY MASS INDEX: 24.91 KG/M2 | DIASTOLIC BLOOD PRESSURE: 70 MMHG | WEIGHT: 155 LBS | SYSTOLIC BLOOD PRESSURE: 114 MMHG | HEART RATE: 84 BPM | TEMPERATURE: 97.7 F

## 2025-04-14 DIAGNOSIS — R30.0 BURNING WITH URINATION: ICD-10-CM

## 2025-04-14 DIAGNOSIS — R35.0 FREQUENCY OF URINATION: Primary | ICD-10-CM

## 2025-04-14 DIAGNOSIS — B37.31 YEAST VAGINITIS: ICD-10-CM

## 2025-04-14 DIAGNOSIS — R82.90 ABNORMAL URINALYSIS: ICD-10-CM

## 2025-04-14 LAB
BILIRUB BLD-MCNC: NEGATIVE MG/DL
CLARITY, POC: ABNORMAL
COLOR UR: YELLOW
EXPIRATION DATE: ABNORMAL
GLUCOSE UR STRIP-MCNC: NEGATIVE MG/DL
KETONES UR QL: ABNORMAL
LEUKOCYTE EST, POC: ABNORMAL
Lab: ABNORMAL
NITRITE UR-MCNC: NEGATIVE MG/ML
PH UR: 6 [PH] (ref 5–8)
PROT UR STRIP-MCNC: NEGATIVE MG/DL
RBC # UR STRIP: ABNORMAL /UL
SP GR UR: 1.01 (ref 1–1.03)
UROBILINOGEN UR QL: NORMAL

## 2025-04-14 PROCEDURE — 87186 SC STD MICRODIL/AGAR DIL: CPT | Performed by: FAMILY MEDICINE

## 2025-04-14 PROCEDURE — 87077 CULTURE AEROBIC IDENTIFY: CPT | Performed by: FAMILY MEDICINE

## 2025-04-14 PROCEDURE — 87086 URINE CULTURE/COLONY COUNT: CPT | Performed by: FAMILY MEDICINE

## 2025-04-14 RX ORDER — NITROFURANTOIN 25; 75 MG/1; MG/1
100 CAPSULE ORAL 2 TIMES DAILY
Qty: 20 CAPSULE | Refills: 0 | Status: SHIPPED | OUTPATIENT
Start: 2025-04-14

## 2025-04-14 RX ORDER — PHENAZOPYRIDINE HYDROCHLORIDE 100 MG/1
100 TABLET, FILM COATED ORAL 3 TIMES DAILY PRN
Qty: 15 TABLET | Refills: 0 | Status: SHIPPED | OUTPATIENT
Start: 2025-04-14

## 2025-04-14 RX ORDER — FLUCONAZOLE 150 MG/1
150 TABLET ORAL ONCE
Qty: 1 TABLET | Refills: 2 | Status: SHIPPED | OUTPATIENT
Start: 2025-04-14 | End: 2025-04-14

## 2025-04-14 NOTE — PROGRESS NOTES
"Subjective   Uriel Singleton is a 45 y.o. female.         Chief Complaint   Patient presents with    Urinary Frequency     Frequency, itching, burning, odor and cloudy. 3 days    Vaginitis     Yeast infection.  Took a monistat       Visit Vitals  /70 (BP Location: Right arm, Patient Position: Sitting, Cuff Size: Adult)   Pulse 84   Temp 97.7 °F (36.5 °C)   Ht 167.6 cm (66\")   Wt 70.3 kg (155 lb)   SpO2 96%   BMI 25.02 kg/m²       Wt Readings from Last 3 Encounters:   04/14/25 70.3 kg (155 lb)   03/27/25 72.8 kg (160 lb 6.4 oz)   08/08/23 78.5 kg (173 lb)       Urinary Frequency   This is a new problem. The current episode started 1 to 4 weeks ago. The problem occurs constantly. The problem has been unchanged. The quality of the pain is described as burning. The pain is at a severity of 8/10. There has been no fever. Associated symptoms include frequency and urgency. Pertinent negatives include no chills, discharge, flank pain, hematuria, hesitancy, nausea, possible pregnancy, sweats or vomiting. She has tried increased fluids for the symptoms. The treatment provided significant relief. There is no history of catheterization, kidney stones, recurrent UTIs, a single kidney, urinary stasis or a urological procedure.   Vaginitis  Symptoms are new.   Onset was in the past 7 days.   Symptoms occur constantly.   Symptoms have been unchanged since onset.   Symptoms include dysuria.    Pertinent negative symptoms include no abdominal pain, no anorexia, no joint pain, no change in stool, no chest pain, no chills, no congestion, no cough, no diaphoresis, no fatigue, no fever, no headaches, no joint swelling, no myalgias, no nausea, no neck pain, no numbness, no rash, no sore throat, no swollen glands, no vertigo, no visual change, no vomiting and no weakness.   Exacerbated by: one piece swim suit.   Treatments tried: monistat.   Improvement on treatment was mild.      Pt has lost 27# following Keto diet. Goal is 40# " total.     The following portions of the patient's history were reviewed and updated as appropriate: allergies, current medications, past family history, past medical history, past social history, past surgical history, and problem list.    Past Medical History:   Diagnosis Date    Anxiety     Arthritis     GERD (gastroesophageal reflux disease)     Headache     Low back pain     Scoliosis       Past Surgical History:   Procedure Laterality Date    COSMETIC SURGERY      Ear correction    TONSILLECTOMY      TONSILLECTOMY        Family History   Problem Relation Age of Onset    Arthritis Mother     Cancer Mother     Thyroid disease Mother     Anxiety disorder Father     Arthritis Father     Hyperlipidemia Father     Hypertension Father     Breast cancer Maternal Grandmother 70    Cancer Maternal Grandmother     Heart disease Maternal Grandmother     Breast cancer Maternal Aunt         late 40's early 50's    Cancer Maternal Aunt     Arthritis Paternal Grandmother     Hyperlipidemia Paternal Grandmother     Ovarian cancer Neg Hx       Social History     Socioeconomic History    Marital status:    Tobacco Use    Smoking status: Former     Current packs/day: 0.00     Average packs/day: 0.5 packs/day for 20.0 years (10.0 ttl pk-yrs)     Types: Cigarettes     Start date: 1995     Quit date: 2013     Years since quittin.2    Smokeless tobacco: Never   Vaping Use    Vaping status: Never Used   Substance and Sexual Activity    Alcohol use: Yes     Alcohol/week: 1.0 standard drink of alcohol     Types: 1 Cans of beer per week     Comment: Rare    Drug use: No    Sexual activity: Yes     Partners: Male     Birth control/protection: Vasectomy      Allergies   Allergen Reactions    Azithromycin     Cefdinir Other (See Comments)    Cefuroxime     Ciprofloxacin Other (See Comments)    Erythromycin     Fish-Derived Products Nausea And Vomiting    Latex Unknown - Low Severity    Sulfa Antibiotics  Other (See Comments)    Tetracycline     Latex, Natural Rubber Rash       Review of Systems   Constitutional:  Negative for chills, diaphoresis, fatigue and fever.   HENT:  Negative for congestion and sore throat.    Respiratory:  Negative for cough.    Cardiovascular:  Negative for chest pain.   Gastrointestinal:  Negative for abdominal pain, anorexia, nausea and vomiting.   Genitourinary:  Positive for dysuria, frequency and urgency. Negative for flank pain, hematuria and hesitancy.   Musculoskeletal:  Negative for joint pain, myalgias and neck pain.   Skin:  Negative for rash.   Neurological:  Negative for vertigo, weakness, numbness and headaches.       Objective   Physical Exam  Vitals and nursing note reviewed.   Constitutional:       Appearance: She is well-developed.   HENT:      Head: Normocephalic.      Right Ear: External ear normal.      Left Ear: External ear normal.      Nose: Nose normal.   Eyes:      General: Lids are normal.      Conjunctiva/sclera: Conjunctivae normal.      Pupils: Pupils are equal, round, and reactive to light.   Neck:      Thyroid: No thyroid mass or thyromegaly.      Vascular: No carotid bruit.      Trachea: Trachea normal.   Cardiovascular:      Rate and Rhythm: Normal rate and regular rhythm.      Heart sounds: No murmur heard.  Pulmonary:      Effort: Pulmonary effort is normal. No respiratory distress.      Breath sounds: Normal breath sounds. No decreased breath sounds, wheezing, rhonchi or rales.   Chest:      Chest wall: No tenderness.   Abdominal:      General: Bowel sounds are normal.      Palpations: Abdomen is soft.      Tenderness: There is no abdominal tenderness. There is no right CVA tenderness or left CVA tenderness.   Musculoskeletal:         General: Normal range of motion.      Cervical back: Normal range of motion and neck supple.   Skin:     General: Skin is warm and dry.   Neurological:      Mental Status: She is alert and oriented to person, place, and  time.   Psychiatric:         Behavior: Behavior normal.         Assessment & Plan   Problems Addressed this Visit          Genitourinary and Reproductive     Yeast vaginitis    Relevant Medications    fluconazole (Diflucan) 150 MG tablet     Other Visit Diagnoses         Frequency of urination    -  Primary    Relevant Medications    nitrofurantoin, macrocrystal-monohydrate, (Macrobid) 100 MG capsule    Other Relevant Orders    POCT urinalysis dipstick, automated (Completed)      Burning with urination        Relevant Medications    phenazopyridine (Pyridium) 100 MG tablet    Other Relevant Orders    POCT urinalysis dipstick, automated (Completed)      Abnormal urinalysis        Relevant Medications    nitrofurantoin, macrocrystal-monohydrate, (Macrobid) 100 MG capsule    Other Relevant Orders    Urine Culture - Urine, Urine, Clean Catch          Diagnoses         Codes Comments      Frequency of urination    -  Primary ICD-10-CM: R35.0  ICD-9-CM: 788.41       Burning with urination     ICD-10-CM: R30.0  ICD-9-CM: 788.1       Abnormal urinalysis     ICD-10-CM: R82.90  ICD-9-CM: 791.9       Yeast vaginitis     ICD-10-CM: B37.31  ICD-9-CM: 112.1                        Current Outpatient Medications:     ALPRAZolam (XANAX) 0.5 MG tablet, Take 1 tablet by mouth 2 (Two) Times a Day., Disp: 60 tablet, Rfl: 0    ascorbic acid (VITAMIN C) 1000 MG tablet, Vitamin C  1000 once a day, Disp: , Rfl:     Biotin 84228 MCG tablet, biotin (Patient taking differently: Take 5,000 mcg by mouth Daily.), Disp: , Rfl:     cyclobenzaprine (FLEXERIL) 5 MG tablet, Take 1 tablet by mouth 3 (Three) Times a Day As Needed for Muscle Spasms., Disp: 30 tablet, Rfl: 0    loratadine (CLARITIN) 10 MG tablet, Claritin 10 mg tablet  Take 1 tablet every day by oral route., Disp: , Rfl:     PROGESTERONE PO, Take 220 mg by mouth Daily As Needed., Disp: , Rfl:     Testosterone 100 MG pellet, by Implant route., Disp: , Rfl:     Vitamin D-Vitamin K (VITAMIN  K2-VITAMIN D3 PO), Take 5,000 Units by mouth Daily., Disp: , Rfl:     amoxicillin-clavulanate (AUGMENTIN) 875-125 MG per tablet, Take 1 tablet by mouth 2 (Two) Times a Day. (Patient not taking: Reported on 3/27/2025), Disp: 20 tablet, Rfl: 0    fluconazole (Diflucan) 150 MG tablet, Take 1 tablet by mouth 1 (One) Time for 1 dose., Disp: 1 tablet, Rfl: 2    nitrofurantoin, macrocrystal-monohydrate, (Macrobid) 100 MG capsule, Take 1 capsule by mouth 2 (Two) Times a Day., Disp: 20 capsule, Rfl: 0    phenazopyridine (Pyridium) 100 MG tablet, Take 1 tablet by mouth 3 (Three) Times a Day As Needed for Bladder Spasms., Disp: 15 tablet, Rfl: 0    Return if symptoms worsen or fail to improve, for Recheck.    Recent Results (from the past week)   POCT urinalysis dipstick, automated    Collection Time: 04/14/25  3:31 PM    Specimen: Urine   Result Value Ref Range    Color Yellow Yellow, Straw, Dark Yellow, Ivanna    Clarity, UA Slightly Cloudy (A) Clear    Specific Gravity  1.010 1.005 - 1.030    pH, Urine 6.0 5.0 - 8.0    Leukocytes Small (1+) (A) Negative    Nitrite, UA Negative Negative    Protein, POC Negative Negative mg/dL    Glucose, UA Negative Negative mg/dL    Ketones, UA 1+ (A) Negative    Urobilinogen, UA Normal Normal, 0.2 E.U./dL    Bilirubin Negative Negative    Blood, UA 1+ (A) Negative    Lot Number [id number]     Expiration Date 7/10/2026

## 2025-04-17 ENCOUNTER — RESULTS FOLLOW-UP (OUTPATIENT)
Dept: INTERNAL MEDICINE | Facility: CLINIC | Age: 45
End: 2025-04-17
Payer: COMMERCIAL

## 2025-04-17 LAB — BACTERIA SPEC AEROBE CULT: ABNORMAL

## 2025-04-25 DIAGNOSIS — R82.90 ABNORMAL URINALYSIS: ICD-10-CM

## 2025-04-25 DIAGNOSIS — R35.0 FREQUENCY OF URINATION: ICD-10-CM

## 2025-04-25 RX ORDER — NITROFURANTOIN 25; 75 MG/1; MG/1
100 CAPSULE ORAL 2 TIMES DAILY
Qty: 20 CAPSULE | Refills: 0 | Status: SHIPPED | OUTPATIENT
Start: 2025-04-25

## 2025-04-26 DIAGNOSIS — F41.9 ANXIETY: ICD-10-CM

## 2025-04-29 DIAGNOSIS — F41.9 ANXIETY: ICD-10-CM

## 2025-04-29 RX ORDER — ALPRAZOLAM 0.5 MG
0.5 TABLET ORAL 2 TIMES DAILY
Qty: 60 TABLET | Refills: 2 | Status: SHIPPED | OUTPATIENT
Start: 2025-04-29

## 2025-04-30 RX ORDER — ALPRAZOLAM 0.5 MG
0.5 TABLET ORAL 2 TIMES DAILY
Qty: 60 TABLET | Refills: 0 | OUTPATIENT
Start: 2025-04-30

## 2025-05-01 ENCOUNTER — TELEPHONE (OUTPATIENT)
Dept: INTERNAL MEDICINE | Facility: CLINIC | Age: 45
End: 2025-05-01
Payer: COMMERCIAL

## 2025-05-01 DIAGNOSIS — R35.0 FREQUENCY OF URINATION: Primary | ICD-10-CM

## 2025-05-01 DIAGNOSIS — N39.0 RECURRENT UTI: ICD-10-CM

## 2025-05-01 NOTE — TELEPHONE ENCOUNTER
Patient called asking is Dr. Harrell saw her message on [employer] about referring her to Urology.  I did let her know that he has seen her message and that the order has been put in for Urology with  and that someone will be reaching out to her

## 2025-06-26 ENCOUNTER — OFFICE VISIT (OUTPATIENT)
Dept: INTERNAL MEDICINE | Facility: CLINIC | Age: 45
End: 2025-06-26
Payer: COMMERCIAL

## 2025-06-26 VITALS
TEMPERATURE: 97.8 F | DIASTOLIC BLOOD PRESSURE: 78 MMHG | HEART RATE: 77 BPM | HEIGHT: 66 IN | RESPIRATION RATE: 20 BRPM | OXYGEN SATURATION: 99 % | WEIGHT: 151 LBS | BODY MASS INDEX: 24.27 KG/M2 | SYSTOLIC BLOOD PRESSURE: 122 MMHG

## 2025-06-26 DIAGNOSIS — R13.10 DYSPHAGIA, UNSPECIFIED TYPE: ICD-10-CM

## 2025-06-26 DIAGNOSIS — M62.838 CERVICAL PARASPINAL MUSCLE SPASM: ICD-10-CM

## 2025-06-26 DIAGNOSIS — F41.9 ANXIETY: Primary | ICD-10-CM

## 2025-06-26 DIAGNOSIS — K21.9 GASTROESOPHAGEAL REFLUX DISEASE, UNSPECIFIED WHETHER ESOPHAGITIS PRESENT: ICD-10-CM

## 2025-06-26 DIAGNOSIS — B37.9 YEAST INFECTION: ICD-10-CM

## 2025-06-26 PROCEDURE — 99214 OFFICE O/P EST MOD 30 MIN: CPT | Performed by: STUDENT IN AN ORGANIZED HEALTH CARE EDUCATION/TRAINING PROGRAM

## 2025-06-26 RX ORDER — CYCLOBENZAPRINE HCL 5 MG
5 TABLET ORAL 3 TIMES DAILY PRN
Qty: 30 TABLET | Refills: 2 | Status: SHIPPED | OUTPATIENT
Start: 2025-06-26

## 2025-06-26 RX ORDER — ALPRAZOLAM 0.5 MG
0.5 TABLET ORAL 2 TIMES DAILY
Qty: 60 TABLET | Refills: 2 | Status: SHIPPED | OUTPATIENT
Start: 2025-06-26

## 2025-06-26 RX ORDER — FLUCONAZOLE 150 MG/1
150 TABLET ORAL ONCE
Qty: 1 TABLET | Refills: 0 | Status: SHIPPED | OUTPATIENT
Start: 2025-06-26 | End: 2025-06-26

## 2025-06-26 NOTE — PROGRESS NOTES
Office Note     Name: Uriel Singleton    : 1980     MRN: [id number]     Chief Complaint  Anxiety    Subjective     History of Present Illness:  Uriel Singleton is a 45 y.o. female who presents today for     Follow up for anxiety  Today she is doing well, reports anxiety is controlled with her medication  She is tolerating Xanax 0.5mg BID, this has been a chronic medication   Denies any confusion, no falls, no sleepiness, no depressed mood.      Prior anxiety hx   .  She has the following anxiety symptoms: difficulty concentrating, feelings of losing control, irritable, palpitations, racing thoughts. Onset of symptoms was approximately a few years ago.  Symptoms have been stable since that time. She denies current suicidal and homicidal ideation. Family history significant for anxiety and depression.Possible organic causes contributing are: none. Risk factors: previous episode of depression Previous treatment includes individual therapy and medication benzodiazepines xanax 0.5mg  .   She complains of the following medication side effects: none.      complains of heartburn. This has been associated with bilious reflux.  She denies hematemesis, melena, and unexpected weight loss. Symptoms have been present for a few months. She has some difficulty with swallowing, feels food gets caught. She has not lost weight. She denies melena, hematochezia, hematemesis, and coffee ground emesis.    Having posterior neck tightness. Feels like a tight pull when she bends her neck, she typically take muscles relaxers when she pulls a muscle.     Has complaints of vaginal yeast infection, itching for the past week    Review of Systems:   Review of Systems   All other systems reviewed and are negative.      Past Medical History:   Past Medical History:   Diagnosis Date    Anxiety     Arthritis     GERD (gastroesophageal reflux disease)     Headache     Low back pain     Scoliosis        Past Surgical  History:   Past Surgical History:   Procedure Laterality Date    COSMETIC SURGERY      Ear correction    TONSILLECTOMY      TONSILLECTOMY         Family History:   Family History   Problem Relation Age of Onset    Arthritis Mother     Cancer Mother     Thyroid disease Mother     Anxiety disorder Father     Arthritis Father     Hyperlipidemia Father     Hypertension Father     Breast cancer Maternal Grandmother 70    Cancer Maternal Grandmother     Heart disease Maternal Grandmother     Breast cancer Maternal Aunt         late 40's early 50's    Cancer Maternal Aunt     Arthritis Paternal Grandmother     Hyperlipidemia Paternal Grandmother     Ovarian cancer Neg Hx        Social History:   Social History     Socioeconomic History    Marital status:    Tobacco Use    Smoking status: Former     Current packs/day: 0.00     Average packs/day: 0.5 packs/day for 20.0 years (10.0 ttl pk-yrs)     Types: Cigarettes     Start date: 1995     Quit date: 2013     Years since quittin.5    Smokeless tobacco: Never   Vaping Use    Vaping status: Never Used   Substance and Sexual Activity    Alcohol use: Yes     Alcohol/week: 1.0 standard drink of alcohol     Types: 1 Cans of beer per week     Comment: Rare    Drug use: No    Sexual activity: Yes     Partners: Male     Birth control/protection: Vasectomy       Immunizations:   There is no immunization history on file for this patient.     Medications:     Current Outpatient Medications:     ALPRAZolam (XANAX) 0.5 MG tablet, Take 1 tablet by mouth 2 (Two) Times a Day., Disp: 60 tablet, Rfl: 2    ascorbic acid (VITAMIN C) 1000 MG tablet, Vitamin C  1000 once a day, Disp: , Rfl:     cyclobenzaprine (FLEXERIL) 5 MG tablet, Take 1 tablet by mouth 3 (Three) Times a Day As Needed for Muscle Spasms., Disp: 30 tablet, Rfl: 2    loratadine (CLARITIN) 10 MG tablet, Claritin 10 mg tablet  Take 1 tablet every day by oral route., Disp: , Rfl:     PROGESTERONE PO, Take  "220 mg by mouth Daily As Needed., Disp: , Rfl:     Testosterone 100 MG pellet, by Implant route., Disp: , Rfl:     Vitamin D-Vitamin K (VITAMIN K2-VITAMIN D3 PO), Take 5,000 Units by mouth Daily., Disp: , Rfl:     Allergies:   Allergies   Allergen Reactions    Azithromycin     Cefdinir Other (See Comments)    Cefuroxime     Ciprofloxacin Other (See Comments)    Erythromycin     Fish-Derived Products Nausea And Vomiting    Latex Unknown - Low Severity    Sulfa Antibiotics Other (See Comments)    Tetracycline     Latex, Natural Rubber Rash       Objective     Vital Signs  /78   Pulse 77   Temp 97.8 °F (36.6 °C) (Temporal)   Resp 20   Ht 167.6 cm (66\")   Wt 68.5 kg (151 lb)   SpO2 99%   BMI 24.37 kg/m²   Estimated body mass index is 24.37 kg/m² as calculated from the following:    Height as of this encounter: 167.6 cm (66\").    Weight as of this encounter: 68.5 kg (151 lb).    BMI is within normal parameters. No other follow-up for BMI required.      Physical Exam  Constitutional:       Appearance: Normal appearance.   Cardiovascular:      Rate and Rhythm: Normal rate and regular rhythm.      Pulses: Normal pulses.      Heart sounds: Normal heart sounds.   Pulmonary:      Effort: Pulmonary effort is normal.      Breath sounds: Normal breath sounds.   Musculoskeletal:      Cervical back: Spinous process tenderness and muscular tenderness present. Decreased range of motion.   Neurological:      Mental Status: She is alert.          Result Review :                  Assessment and Plan     1. Anxiety  Mood is stable, she is on chronic Benzo. Risk vs benefits reviewed  Anirudh reviewed  - ALPRAZolam (XANAX) 0.5 MG tablet; Take 1 tablet by mouth 2 (Two) Times a Day.  Dispense: 60 tablet; Refill: 2    2. Gastroesophageal reflux disease, unspecified whether esophagitis present    - Ambulatory referral for Screening EGD    3. Dysphagia, unspecified type    - Ambulatory referral for Screening EGD    4. Cervical " paraspinal muscle spasm    - cyclobenzaprine (FLEXERIL) 5 MG tablet; Take 1 tablet by mouth 3 (Three) Times a Day As Needed for Muscle Spasms.  Dispense: 30 tablet; Refill: 2    5. Yeast infection    - fluconazole (Diflucan) 150 MG tablet; Take 1 tablet by mouth 1 (One) Time for 1 dose.  Dispense: 1 tablet; Refill: 0       Follow Up  No follow-ups on file.    Ruslan Harrell MD  [facility]  [facility]  [address]  [address] [id number] [phone]